# Patient Record
Sex: FEMALE | Race: WHITE | NOT HISPANIC OR LATINO
[De-identification: names, ages, dates, MRNs, and addresses within clinical notes are randomized per-mention and may not be internally consistent; named-entity substitution may affect disease eponyms.]

---

## 2021-07-14 ENCOUNTER — TRANSCRIPTION ENCOUNTER (OUTPATIENT)
Age: 66
End: 2021-07-14

## 2021-07-27 ENCOUNTER — TRANSCRIPTION ENCOUNTER (OUTPATIENT)
Age: 66
End: 2021-07-27

## 2021-12-02 ENCOUNTER — OUTPATIENT (OUTPATIENT)
Dept: OUTPATIENT SERVICES | Facility: HOSPITAL | Age: 66
LOS: 1 days | Discharge: HOME | End: 2021-12-02

## 2021-12-06 ENCOUNTER — OUTPATIENT (OUTPATIENT)
Dept: OUTPATIENT SERVICES | Facility: HOSPITAL | Age: 66
LOS: 1 days | Discharge: HOME | End: 2021-12-06
Payer: MEDICARE

## 2021-12-06 DIAGNOSIS — Z87.310 PERSONAL HISTORY OF (HEALED) OSTEOPOROSIS FRACTURE: ICD-10-CM

## 2021-12-06 DIAGNOSIS — M89.9 DISORDER OF BONE, UNSPECIFIED: ICD-10-CM

## 2021-12-06 DIAGNOSIS — Z78.0 ASYMPTOMATIC MENOPAUSAL STATE: ICD-10-CM

## 2021-12-06 DIAGNOSIS — Z13.820 ENCOUNTER FOR SCREENING FOR OSTEOPOROSIS: ICD-10-CM

## 2021-12-06 DIAGNOSIS — Z12.31 ENCOUNTER FOR SCREENING MAMMOGRAM FOR MALIGNANT NEOPLASM OF BREAST: ICD-10-CM

## 2021-12-06 PROCEDURE — 77067 SCR MAMMO BI INCL CAD: CPT | Mod: 26

## 2021-12-06 PROCEDURE — 77063 BREAST TOMOSYNTHESIS BI: CPT | Mod: 26

## 2021-12-16 ENCOUNTER — TRANSCRIPTION ENCOUNTER (OUTPATIENT)
Age: 66
End: 2021-12-16

## 2022-03-16 ENCOUNTER — TRANSCRIPTION ENCOUNTER (OUTPATIENT)
Age: 67
End: 2022-03-16

## 2022-03-16 ENCOUNTER — EMERGENCY (EMERGENCY)
Facility: HOSPITAL | Age: 67
LOS: 0 days | Discharge: HOME | End: 2022-03-16
Attending: STUDENT IN AN ORGANIZED HEALTH CARE EDUCATION/TRAINING PROGRAM | Admitting: STUDENT IN AN ORGANIZED HEALTH CARE EDUCATION/TRAINING PROGRAM
Payer: MEDICARE

## 2022-03-16 VITALS
RESPIRATION RATE: 20 BRPM | SYSTOLIC BLOOD PRESSURE: 135 MMHG | OXYGEN SATURATION: 97 % | TEMPERATURE: 99 F | DIASTOLIC BLOOD PRESSURE: 70 MMHG | HEART RATE: 89 BPM

## 2022-03-16 VITALS
HEART RATE: 95 BPM | SYSTOLIC BLOOD PRESSURE: 146 MMHG | TEMPERATURE: 100 F | OXYGEN SATURATION: 96 % | WEIGHT: 293 LBS | DIASTOLIC BLOOD PRESSURE: 76 MMHG | RESPIRATION RATE: 19 BRPM

## 2022-03-16 DIAGNOSIS — U07.1 COVID-19: ICD-10-CM

## 2022-03-16 DIAGNOSIS — Z98.890 OTHER SPECIFIED POSTPROCEDURAL STATES: Chronic | ICD-10-CM

## 2022-03-16 DIAGNOSIS — D86.9 SARCOIDOSIS, UNSPECIFIED: ICD-10-CM

## 2022-03-16 DIAGNOSIS — E78.5 HYPERLIPIDEMIA, UNSPECIFIED: ICD-10-CM

## 2022-03-16 DIAGNOSIS — R50.9 FEVER, UNSPECIFIED: ICD-10-CM

## 2022-03-16 DIAGNOSIS — I10 ESSENTIAL (PRIMARY) HYPERTENSION: ICD-10-CM

## 2022-03-16 DIAGNOSIS — R05.9 COUGH, UNSPECIFIED: ICD-10-CM

## 2022-03-16 DIAGNOSIS — R06.02 SHORTNESS OF BREATH: ICD-10-CM

## 2022-03-16 LAB — SARS-COV-2 RNA SPEC QL NAA+PROBE: DETECTED

## 2022-03-16 PROCEDURE — 99284 EMERGENCY DEPT VISIT MOD MDM: CPT | Mod: FS,CS

## 2022-03-16 PROCEDURE — 71045 X-RAY EXAM CHEST 1 VIEW: CPT | Mod: 26

## 2022-03-16 RX ORDER — IPRATROPIUM/ALBUTEROL SULFATE 18-103MCG
3 AEROSOL WITH ADAPTER (GRAM) INHALATION ONCE
Refills: 0 | Status: COMPLETED | OUTPATIENT
Start: 2022-03-16 | End: 2022-03-16

## 2022-03-16 RX ORDER — DEXAMETHASONE 0.5 MG/5ML
1 ELIXIR ORAL
Qty: 5 | Refills: 0
Start: 2022-03-16 | End: 2022-03-20

## 2022-03-16 RX ORDER — DEXAMETHASONE 0.5 MG/5ML
6 ELIXIR ORAL ONCE
Refills: 0 | Status: COMPLETED | OUTPATIENT
Start: 2022-03-16 | End: 2022-03-16

## 2022-03-16 RX ADMIN — Medication 6 MILLIGRAM(S): at 14:05

## 2022-03-16 RX ADMIN — Medication 3 MILLILITER(S): at 14:06

## 2022-03-16 NOTE — ED PROVIDER NOTE - NS ED ATTENDING STATEMENT MOD
This was a shared visit with the PRASANNA. I reviewed and verified the documentation and independently performed the documented:

## 2022-03-16 NOTE — ED PROVIDER NOTE - CLINICAL SUMMARY MEDICAL DECISION MAKING FREE TEXT BOX
66-year-old female with a past medical history of hypertension hyperlipidemia and sarcoidosis presents here complaining of shortness of breath.  Patient states she went to  her grand son on Tuesday found out that he was Covid positive went to the urgent care found out she was Covid positive patient states for the last day or so she is complaining of shortness of breath and discomfort when she coughs.  Fever at home T-max of 99 no nausea vomiting abdominal pain.  Triple vaccinated.  Sating well on room air. VS reviewed labs imaging obtained and reviewed nebs and steroids given. Patient enrolled in monoclonal antibody, pulse 0x provided. Patient a spoken to in detail about results  All questions addressed.  Results of ED work up discussed Strict Return precautions given.

## 2022-03-16 NOTE — ED ADULT TRIAGE NOTE - PAIN: PRESENCE, MLM

## 2022-03-16 NOTE — ED ADULT NURSE NOTE - NSIMPLEMENTINTERV_GEN_ALL_ED
Implemented All Universal Safety Interventions:  [place] to call system. Call bell, personal items and telephone within reach. Instruct patient to call for assistance. Room bathroom lighting operational. Non-slip footwear when patient is off stretcher. Physically safe environment: no spills, clutter or unnecessary equipment. Stretcher in lowest position, wheels locked, appropriate side rails in place.

## 2022-03-16 NOTE — ED PROVIDER NOTE - NSFOLLOWUPINSTRUCTIONS_ED_ALL_ED_FT
Take decadron 6 mg 1 time a day, Use your nebulizer 3-4 time a day, Follow up with mono clonal antibody infusion, Your daughter should receive a call today.

## 2022-03-16 NOTE — ED ADULT NURSE NOTE - NS ED PATIENT SAFETY CONCERN
No
Instructed patient/caregiver regarding signs and symptoms of infection./Verbal/written post procedure instructions were given to patient/caregiver.

## 2022-03-16 NOTE — ED PROVIDER NOTE - ATTENDING CONTRIBUTION TO CARE
66-year-old female with a past medical history of hypertension hyperlipidemia and sarcoidosis presents here complaining of shortness of breath.  Patient states she went to  her grand son on Tuesday found out that he was Covid positive went to the urgent care found out she was Covid positive patient states for the last day or so she is complaining of shortness of breath and discomfort when she coughs.  Fever at home T-max of 99 no nausea vomiting abdominal pain.  Triple vaccinated.    CONSTITUTIONAL: WA / WN / NAD  HEAD: NCAT  EYES: PERRL; EOMI;   ENT: Normal pharynx; mucous membranes pink/moist, no erythema.  NECK: Supple; no meningeal signs  CARD: RRR; nl S1/S2; no M/R/G. Pulses equal bilaterally.  RESP: Respiratory rate and effort are normal; mild b/l crackles  ABD: Soft, NT ND  MSK/EXT: No gross deformities; full range of motion.  SKIN: Warm and dry;   NEURO: AAOx3,  PSYCH: Memory Intact, Normal Affect

## 2022-03-16 NOTE — ED PROVIDER NOTE - OBJECTIVE STATEMENT
Patient c/o cough, SOB, for 2 days, Dx with covid 1 day ago, H/o sarcoidosis, + fever, + chill,  no chest pain

## 2022-03-16 NOTE — ED PROVIDER NOTE - PATIENT PORTAL LINK FT
You can access the FollowMyHealth Patient Portal offered by [facility] by registering at the following website: [email or web address]. By joining [facility]’s FollowMyHealth portal, you will also be able to view your health information using other applications (apps) compatible with our system.

## 2022-03-17 PROBLEM — E78.00 PURE HYPERCHOLESTEROLEMIA, UNSPECIFIED: Chronic | Status: ACTIVE | Noted: 2022-03-16

## 2022-03-17 PROBLEM — D86.9 SARCOIDOSIS, UNSPECIFIED: Chronic | Status: ACTIVE | Noted: 2022-03-16

## 2022-03-17 PROBLEM — I10 ESSENTIAL (PRIMARY) HYPERTENSION: Chronic | Status: ACTIVE | Noted: 2022-03-16

## 2022-03-18 ENCOUNTER — APPOINTMENT (OUTPATIENT)
Dept: DISASTER EMERGENCY | Facility: HOSPITAL | Age: 67
End: 2022-03-18

## 2022-03-18 ENCOUNTER — OUTPATIENT (OUTPATIENT)
Dept: OUTPATIENT SERVICES | Facility: HOSPITAL | Age: 67
LOS: 1 days | End: 2022-03-18

## 2022-03-18 VITALS
HEART RATE: 74 BPM | RESPIRATION RATE: 18 BRPM | SYSTOLIC BLOOD PRESSURE: 133 MMHG | TEMPERATURE: 99 F | OXYGEN SATURATION: 94 % | DIASTOLIC BLOOD PRESSURE: 80 MMHG

## 2022-03-18 VITALS
DIASTOLIC BLOOD PRESSURE: 71 MMHG | WEIGHT: 173.94 LBS | RESPIRATION RATE: 18 BRPM | OXYGEN SATURATION: 94 % | TEMPERATURE: 98 F | HEART RATE: 81 BPM | HEIGHT: 63 IN | SYSTOLIC BLOOD PRESSURE: 149 MMHG

## 2022-03-18 DIAGNOSIS — Z98.890 OTHER SPECIFIED POSTPROCEDURAL STATES: Chronic | ICD-10-CM

## 2022-03-18 DIAGNOSIS — U07.1 COVID-19: ICD-10-CM

## 2022-03-18 RX ORDER — SOTROVIMAB 62.5 MG/ML
500 INJECTION, SOLUTION, CONCENTRATE INTRAVENOUS ONCE
Refills: 0 | Status: COMPLETED | OUTPATIENT
Start: 2022-03-18 | End: 2022-03-18

## 2022-03-18 RX ORDER — SODIUM CHLORIDE 9 MG/ML
250 INJECTION INTRAMUSCULAR; INTRAVENOUS; SUBCUTANEOUS
Refills: 0 | Status: COMPLETED | OUTPATIENT
Start: 2022-03-18 | End: 2022-03-18

## 2022-03-18 RX ADMIN — SOTROVIMAB 116 MILLIGRAM(S): 62.5 INJECTION, SOLUTION, CONCENTRATE INTRAVENOUS at 12:18

## 2022-03-18 RX ADMIN — SODIUM CHLORIDE 100 MILLILITER(S): 9 INJECTION INTRAMUSCULAR; INTRAVENOUS; SUBCUTANEOUS at 12:19

## 2022-03-18 NOTE — MONOCLONAL ANTIBODY INFUSION - ASSESSMENT AND PLAN
65 y/o female w/ hx of sarcoidosis on steroids who came in for monoclonal antibody infusion for being tested positive for COVID 19 on 3/8/22 The pt states she has been experiencing HA, sore throat & cough on 3/8/22. She is fully vaxxed & boostered.    PLAN:  - infusion procedure explained to patient   - Consent for monoclonal antibody infusion obtained   - Risk & benefits discussed/all questions answered  - infuse Sotrovimab 500 mg over 30 minutes   -observe patient for one hour post infusion     I have reviewed the Sotrovimab Emergency Use Authorization (EUA) and I have provided the patient or patient's caregiver with the following information:    1. FDA has authorized emergency use Sotrovimab, which is not an FDA-approved biological product.  2. The patient or patient's caregiver has the option to accept or refuse administration of Sotrovimab.  3. The significant known and potential risks and benefits of Sotrovimab and the extent to which such risks and benefits are unknown.  4. Information on available alternative treatments and risks and benefits of those alternatives.     The patient's COVID monoclonal antibody infusion went well without any complications. The patient tolerated the infusion without any reactions. Her/His vitals were stable throughout the infusion. The pt denies any CP, fevers, chills, SOB, numbness/tingling in b/l limbs, loss of sensation or motor function, n/v/d while receiving the transfusion. She denies any symptoms an hour after post infusion. Her vitals were taken post transfusion and were stable. Pt is medically cleared to be discharged home. Discharge instructions were provided to the patient with a fact sheet included. Patient was instructed to self-isolate and use infection control measures (e.g wear mask, isolate, social distance, avoid sharing personal items, clean and disinfect "high touch" surfaces, and frequent handwashing according to the CDC guidelines. The patient was informed o what symptoms to be aware of for the next couple of days, and if there are any issues to call the 24/7 clinical call center. Patient was instructed to follow up with her PCP as needed.

## 2022-03-18 NOTE — MONOCLONAL ANTIBODY INFUSION - EXAM
Physical Exam:    Appearance: NAD	  Skin: warm and dry  Neurologic: Non-focal  Extremities: Normal range of motion

## 2022-07-29 PROBLEM — Z00.00 ENCOUNTER FOR PREVENTIVE HEALTH EXAMINATION: Status: ACTIVE | Noted: 2022-07-29

## 2022-08-02 ENCOUNTER — APPOINTMENT (OUTPATIENT)
Dept: NEUROSURGERY | Facility: CLINIC | Age: 67
End: 2022-08-02

## 2022-08-02 DIAGNOSIS — G56.03 CARPAL TUNNEL SYNDROM,BILATERAL UPPER LIMBS: ICD-10-CM

## 2022-08-02 PROCEDURE — 99204 OFFICE O/P NEW MOD 45 MIN: CPT

## 2022-08-02 NOTE — PHYSICAL EXAM
[de-identified] :   She is a pleasant 66-year-old female.  She is alert and oriented x4.  She is moving all her extremities well with 5/5 strength.  There is some pain inhibited weakness to the left external rotator.  She has positive bilateral Tinel sign at the wrist.  Negative Dalton.  She has is some loss of range of motion of the cervical spine secondary to pain.  She has tenderness to the midline of the cervical spine from the C4-5 region down.\par \par She walks with a steady, independent gait.

## 2022-08-02 NOTE — HISTORY OF PRESENT ILLNESS
[de-identified] : Patient presents today for neurosurgical consultation.  She presents with ongoing neck pain as well as numbness and tingling in the upper extremities, right worse than left.  The numbness and tingling is in a C6 distribution.  She has pain with range of motion of her neck.  She has not undergone any conservative treatment such as injection treatments pain management.  She did try some bracing to her bilateral wrists for treatment of suspected carpal tunnel syndrome however without improvement.\par \par MRI c-spine was completed at [facility] June 2022 and reveals C5-6-7 disc herniation with mild to moderate central canal stenosis and bilateral moderate foraminal narrowing. There is a slight anterolisthesis also appreciated at C4-5.

## 2022-08-02 NOTE — DISCUSSION/SUMMARY
[de-identified] : PLAN:   I had a long and thorough discussion with the patient regarding her findings, symptoms, treatment options.  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  She will trial a course of vitamin B6 in effort to alleviate the paresthesias.  She  is interested in conservative treatment measures such as injection treatments.  I have referred her to a pain management specialist for consideration.  In the future EMG testing would likely be of benefit.  She will follow-up with me in the future after undergoing some injection treatments.  She will also complete flexion and extension x-rays to evaluate for instability given the anterolisthesis appreciated on MRI imaging.  She understood our plan of care well.  She will call barring any issues. \par \par Refugio Anna MD\par \par I, Emilia Joaquin, attest that this documentation has been prepared under the direction and in the presence of Provider Refugio Anna MD. \par \par Thank you for allowing me to assist in the care of this patient.\par

## 2022-08-12 ENCOUNTER — EMERGENCY (EMERGENCY)
Facility: HOSPITAL | Age: 67
LOS: 0 days | Discharge: HOME | End: 2022-08-12
Attending: EMERGENCY MEDICINE | Admitting: EMERGENCY MEDICINE

## 2022-08-12 VITALS
HEART RATE: 69 BPM | DIASTOLIC BLOOD PRESSURE: 69 MMHG | OXYGEN SATURATION: 97 % | SYSTOLIC BLOOD PRESSURE: 125 MMHG | RESPIRATION RATE: 18 BRPM

## 2022-08-12 VITALS
WEIGHT: 173.94 LBS | OXYGEN SATURATION: 97 % | HEART RATE: 89 BPM | TEMPERATURE: 99 F | SYSTOLIC BLOOD PRESSURE: 157 MMHG | RESPIRATION RATE: 18 BRPM | HEIGHT: 63 IN | DIASTOLIC BLOOD PRESSURE: 93 MMHG

## 2022-08-12 DIAGNOSIS — Z98.890 OTHER SPECIFIED POSTPROCEDURAL STATES: Chronic | ICD-10-CM

## 2022-08-12 DIAGNOSIS — R06.02 SHORTNESS OF BREATH: ICD-10-CM

## 2022-08-12 DIAGNOSIS — E78.00 PURE HYPERCHOLESTEROLEMIA, UNSPECIFIED: ICD-10-CM

## 2022-08-12 DIAGNOSIS — R20.2 PARESTHESIA OF SKIN: ICD-10-CM

## 2022-08-12 DIAGNOSIS — R00.2 PALPITATIONS: ICD-10-CM

## 2022-08-12 DIAGNOSIS — R07.89 OTHER CHEST PAIN: ICD-10-CM

## 2022-08-12 DIAGNOSIS — I10 ESSENTIAL (PRIMARY) HYPERTENSION: ICD-10-CM

## 2022-08-12 DIAGNOSIS — D86.9 SARCOIDOSIS, UNSPECIFIED: ICD-10-CM

## 2022-08-12 LAB
ALBUMIN SERPL ELPH-MCNC: 4.6 G/DL — SIGNIFICANT CHANGE UP (ref 3.5–5.2)
ALP SERPL-CCNC: 72 U/L — SIGNIFICANT CHANGE UP (ref 30–115)
ALT FLD-CCNC: 16 U/L — SIGNIFICANT CHANGE UP (ref 0–41)
ANION GAP SERPL CALC-SCNC: 10 MMOL/L — SIGNIFICANT CHANGE UP (ref 7–14)
AST SERPL-CCNC: 17 U/L — SIGNIFICANT CHANGE UP (ref 0–41)
BASOPHILS # BLD AUTO: 0.03 K/UL — SIGNIFICANT CHANGE UP (ref 0–0.2)
BASOPHILS NFR BLD AUTO: 0.5 % — SIGNIFICANT CHANGE UP (ref 0–1)
BILIRUB SERPL-MCNC: 0.2 MG/DL — SIGNIFICANT CHANGE UP (ref 0.2–1.2)
BUN SERPL-MCNC: 16 MG/DL — SIGNIFICANT CHANGE UP (ref 10–20)
CALCIUM SERPL-MCNC: 9.4 MG/DL — SIGNIFICANT CHANGE UP (ref 8.5–10.1)
CHLORIDE SERPL-SCNC: 100 MMOL/L — SIGNIFICANT CHANGE UP (ref 98–110)
CO2 SERPL-SCNC: 27 MMOL/L — SIGNIFICANT CHANGE UP (ref 17–32)
CREAT SERPL-MCNC: 0.7 MG/DL — SIGNIFICANT CHANGE UP (ref 0.7–1.5)
EGFR: 94 ML/MIN/1.73M2 — SIGNIFICANT CHANGE UP
EOSINOPHIL # BLD AUTO: 0.09 K/UL — SIGNIFICANT CHANGE UP (ref 0–0.7)
EOSINOPHIL NFR BLD AUTO: 1.4 % — SIGNIFICANT CHANGE UP (ref 0–8)
GLUCOSE SERPL-MCNC: 122 MG/DL — HIGH (ref 70–99)
HCT VFR BLD CALC: 36.6 % — LOW (ref 37–47)
HGB BLD-MCNC: 12.2 G/DL — SIGNIFICANT CHANGE UP (ref 12–16)
IMM GRANULOCYTES NFR BLD AUTO: 0.3 % — SIGNIFICANT CHANGE UP (ref 0.1–0.3)
LYMPHOCYTES # BLD AUTO: 1.43 K/UL — SIGNIFICANT CHANGE UP (ref 1.2–3.4)
LYMPHOCYTES # BLD AUTO: 22.3 % — SIGNIFICANT CHANGE UP (ref 20.5–51.1)
MAGNESIUM SERPL-MCNC: 2 MG/DL — SIGNIFICANT CHANGE UP (ref 1.8–2.4)
MCHC RBC-ENTMCNC: 27.1 PG — SIGNIFICANT CHANGE UP (ref 27–31)
MCHC RBC-ENTMCNC: 33.3 G/DL — SIGNIFICANT CHANGE UP (ref 32–37)
MCV RBC AUTO: 81.3 FL — SIGNIFICANT CHANGE UP (ref 81–99)
MONOCYTES # BLD AUTO: 0.46 K/UL — SIGNIFICANT CHANGE UP (ref 0.1–0.6)
MONOCYTES NFR BLD AUTO: 7.2 % — SIGNIFICANT CHANGE UP (ref 1.7–9.3)
NEUTROPHILS # BLD AUTO: 4.38 K/UL — SIGNIFICANT CHANGE UP (ref 1.4–6.5)
NEUTROPHILS NFR BLD AUTO: 68.3 % — SIGNIFICANT CHANGE UP (ref 42.2–75.2)
NRBC # BLD: 0 /100 WBCS — SIGNIFICANT CHANGE UP (ref 0–0)
PLATELET # BLD AUTO: 227 K/UL — SIGNIFICANT CHANGE UP (ref 130–400)
POTASSIUM SERPL-MCNC: 3.6 MMOL/L — SIGNIFICANT CHANGE UP (ref 3.5–5)
POTASSIUM SERPL-SCNC: 3.6 MMOL/L — SIGNIFICANT CHANGE UP (ref 3.5–5)
PROT SERPL-MCNC: 6.6 G/DL — SIGNIFICANT CHANGE UP (ref 6–8)
RBC # BLD: 4.5 M/UL — SIGNIFICANT CHANGE UP (ref 4.2–5.4)
RBC # FLD: 12.8 % — SIGNIFICANT CHANGE UP (ref 11.5–14.5)
SARS-COV-2 RNA SPEC QL NAA+PROBE: SIGNIFICANT CHANGE UP
SODIUM SERPL-SCNC: 137 MMOL/L — SIGNIFICANT CHANGE UP (ref 135–146)
TROPONIN T SERPL-MCNC: <0.01 NG/ML — SIGNIFICANT CHANGE UP
WBC # BLD: 6.41 K/UL — SIGNIFICANT CHANGE UP (ref 4.8–10.8)
WBC # FLD AUTO: 6.41 K/UL — SIGNIFICANT CHANGE UP (ref 4.8–10.8)

## 2022-08-12 PROCEDURE — 99285 EMERGENCY DEPT VISIT HI MDM: CPT | Mod: CS

## 2022-08-12 PROCEDURE — 71045 X-RAY EXAM CHEST 1 VIEW: CPT | Mod: 26

## 2022-08-12 PROCEDURE — 93010 ELECTROCARDIOGRAM REPORT: CPT

## 2022-08-12 RX ORDER — SODIUM CHLORIDE 9 MG/ML
500 INJECTION, SOLUTION INTRAVENOUS ONCE
Refills: 0 | Status: COMPLETED | OUTPATIENT
Start: 2022-08-12 | End: 2022-08-12

## 2022-08-12 RX ADMIN — SODIUM CHLORIDE 500 MILLILITER(S): 9 INJECTION, SOLUTION INTRAVENOUS at 19:17

## 2022-08-12 NOTE — ED PROVIDER NOTE - ATTENDING CONTRIBUTION TO CARE
Patient is 58-year-old female who was up at night after drinking tea having palpitations.  She woke up again from sleep around 4 AM with chest tightness over the left chest nonradiating.  Mild shortness of breath.  Intermittent unrelated to exertion.    Exam: Regular rate and rhythm, lungs clear to auscultation, no JVD, no lower extremity edema, no calf tenderness, soft nontender abdomen, no acute distress  Plan: Labs, chest x-ray, EKG

## 2022-08-12 NOTE — ED PROVIDER NOTE - CARE PROVIDER_API CALL
Saúl Hall)  Cardiovascular Disease; Internal Medicine  [address] [place] [address]  Phone: [phone]  Fax: [phone]  Follow Up Time: Routine

## 2022-08-12 NOTE — ED PROVIDER NOTE - CLINICAL SUMMARY MEDICAL DECISION MAKING FREE TEXT BOX
palpitations and chest pain - neg ekg/trop/xr - possibly related to caffeine use - dc home with cardio f/u

## 2022-08-12 NOTE — ED PROVIDER NOTE - PROGRESS NOTE DETAILS
Yanira: Discussed results and risk for ACS with patient. Reassured. Pt feels well and ready to go home.

## 2022-08-12 NOTE — ED PROVIDER NOTE - NS ED ROS FT
Constitutional: No fever  Eyes:  No visual changes, eye pain, or discharge.  ENMT:  No hearing changes, ear pain, sore throat, runny nose, or difficulty swallowing  Cardiac: +chest tightness. No chest pain, SOB or edema. No chest pain with exertion.  Respiratory:  No cough or respiratory distress.   GI:  No nausea, vomiting, diarrhea or abdominal pain.  :  No dysuria, frequency or burning.  MS:  No myalgia, muscle weakness, joint pain or back pain.  Neuro:  No headache or weakness.  No LOC.  Skin:  No skin rash.

## 2022-08-12 NOTE — ED ADULT NURSE NOTE - NSFALLRSKASSESSDT_ED_ALL_ED
12-Aug-2022 20:44 Cibinqo Counseling: I discussed with the patient the risks of Cibinqo therapy including but not limited to common cold, nausea, headache, cold sores, increased blood CPK levels, dizziness, UTIs, fatigue, acne, and vomitting. Live vaccines should be avoided.  This medication has been linked to serious infections; higher rate of mortality; malignancy and lymphoproliferative disorders; major adverse cardiovascular events; thrombosis; thrombocytopenia and lymphopenia; lipid elevations; and retinal detachment.

## 2022-08-12 NOTE — ED PROVIDER NOTE - OBJECTIVE STATEMENT
65 y/o F with PMH HTN, HLD, sarcoidosis presenting with chest tightness that started last night at 1am. A/w tingling in BUE and BLE. Symptoms went away and returned at 4am, waking her up out of her sleep. Has been intermittent today. Not associated with exertion/activity. Denies SOB, fever, chills, cough, abd pain, N/V, diaphoresis, back pain, weakness, difficulty walking. Nonsmoker.

## 2022-08-12 NOTE — ED PROVIDER NOTE - PHYSICAL EXAMINATION
CONSTITUTIONAL: Well-developed; well-nourished; in no acute distress.   SKIN: Warm, dry  HEAD: Normocephalic; atraumatic  EYES: PERRL, EOMI, normal sclera and conjunctiva   ENT: No nasal discharge; airway clear. MMM  NECK: Supple; non tender.  CARD:  Regular rate and rhythm. Normal S1, S2. 2+ distal pulses. No LE edema.  RESP: No increased WOB. CTA b/l without wheezes, crackles, rhonchi  ABD: Normoactive BS. Soft, nontender, nondistended.  EXT: Normal ROM.   LYMPH: No acute cervical adenopathy.  NEURO: Alert, oriented, grossly unremarkable  PSYCH: Cooperative, appropriate.

## 2022-08-24 ENCOUNTER — OUTPATIENT (OUTPATIENT)
Dept: OUTPATIENT SERVICES | Facility: HOSPITAL | Age: 67
LOS: 1 days | Discharge: HOME | End: 2022-08-24

## 2022-08-24 ENCOUNTER — NON-APPOINTMENT (OUTPATIENT)
Age: 67
End: 2022-08-24

## 2022-08-24 DIAGNOSIS — R94.39 ABNORMAL RESULT OF OTHER CARDIOVASCULAR FUNCTION STUDY: ICD-10-CM

## 2022-08-24 DIAGNOSIS — Z98.890 OTHER SPECIFIED POSTPROCEDURAL STATES: Chronic | ICD-10-CM

## 2022-08-24 PROCEDURE — 75574 CT ANGIO HRT W/3D IMAGE: CPT | Mod: 26,MH

## 2022-08-25 ENCOUNTER — INPATIENT (INPATIENT)
Facility: HOSPITAL | Age: 67
LOS: 5 days | Discharge: HOME | End: 2022-08-31
Attending: STUDENT IN AN ORGANIZED HEALTH CARE EDUCATION/TRAINING PROGRAM | Admitting: STUDENT IN AN ORGANIZED HEALTH CARE EDUCATION/TRAINING PROGRAM

## 2022-08-25 ENCOUNTER — APPOINTMENT (OUTPATIENT)
Dept: PAIN MANAGEMENT | Facility: CLINIC | Age: 67
End: 2022-08-25

## 2022-08-25 VITALS
HEART RATE: 111 BPM | TEMPERATURE: 98 F | OXYGEN SATURATION: 95 % | DIASTOLIC BLOOD PRESSURE: 98 MMHG | SYSTOLIC BLOOD PRESSURE: 160 MMHG | RESPIRATION RATE: 18 BRPM | HEIGHT: 63 IN

## 2022-08-25 DIAGNOSIS — Z98.890 OTHER SPECIFIED POSTPROCEDURAL STATES: Chronic | ICD-10-CM

## 2022-08-25 LAB
ALBUMIN SERPL ELPH-MCNC: 4.8 G/DL — SIGNIFICANT CHANGE UP (ref 3.5–5.2)
ALP SERPL-CCNC: 94 U/L — SIGNIFICANT CHANGE UP (ref 30–115)
ALT FLD-CCNC: 22 U/L — SIGNIFICANT CHANGE UP (ref 0–41)
ANION GAP SERPL CALC-SCNC: 14 MMOL/L — SIGNIFICANT CHANGE UP (ref 7–14)
AST SERPL-CCNC: 24 U/L — SIGNIFICANT CHANGE UP (ref 0–41)
BASOPHILS # BLD AUTO: 0.03 K/UL — SIGNIFICANT CHANGE UP (ref 0–0.2)
BASOPHILS NFR BLD AUTO: 0.5 % — SIGNIFICANT CHANGE UP (ref 0–1)
BILIRUB SERPL-MCNC: <0.2 MG/DL — SIGNIFICANT CHANGE UP (ref 0.2–1.2)
BUN SERPL-MCNC: 17 MG/DL — SIGNIFICANT CHANGE UP (ref 10–20)
CALCIUM SERPL-MCNC: 9.5 MG/DL — SIGNIFICANT CHANGE UP (ref 8.5–10.1)
CHLORIDE SERPL-SCNC: 105 MMOL/L — SIGNIFICANT CHANGE UP (ref 98–110)
CO2 SERPL-SCNC: 24 MMOL/L — SIGNIFICANT CHANGE UP (ref 17–32)
CREAT SERPL-MCNC: 0.7 MG/DL — SIGNIFICANT CHANGE UP (ref 0.7–1.5)
EGFR: 94 ML/MIN/1.73M2 — SIGNIFICANT CHANGE UP
EOSINOPHIL # BLD AUTO: 0.13 K/UL — SIGNIFICANT CHANGE UP (ref 0–0.7)
EOSINOPHIL NFR BLD AUTO: 2.2 % — SIGNIFICANT CHANGE UP (ref 0–8)
GLUCOSE SERPL-MCNC: 188 MG/DL — HIGH (ref 70–99)
HCT VFR BLD CALC: 39.4 % — SIGNIFICANT CHANGE UP (ref 37–47)
HGB BLD-MCNC: 13.2 G/DL — SIGNIFICANT CHANGE UP (ref 12–16)
IMM GRANULOCYTES NFR BLD AUTO: 0.2 % — SIGNIFICANT CHANGE UP (ref 0.1–0.3)
LYMPHOCYTES # BLD AUTO: 1.82 K/UL — SIGNIFICANT CHANGE UP (ref 1.2–3.4)
LYMPHOCYTES # BLD AUTO: 30.3 % — SIGNIFICANT CHANGE UP (ref 20.5–51.1)
MCHC RBC-ENTMCNC: 27.3 PG — SIGNIFICANT CHANGE UP (ref 27–31)
MCHC RBC-ENTMCNC: 33.5 G/DL — SIGNIFICANT CHANGE UP (ref 32–37)
MCV RBC AUTO: 81.4 FL — SIGNIFICANT CHANGE UP (ref 81–99)
MONOCYTES # BLD AUTO: 0.42 K/UL — SIGNIFICANT CHANGE UP (ref 0.1–0.6)
MONOCYTES NFR BLD AUTO: 7 % — SIGNIFICANT CHANGE UP (ref 1.7–9.3)
NEUTROPHILS # BLD AUTO: 3.6 K/UL — SIGNIFICANT CHANGE UP (ref 1.4–6.5)
NEUTROPHILS NFR BLD AUTO: 59.8 % — SIGNIFICANT CHANGE UP (ref 42.2–75.2)
NRBC # BLD: 0 /100 WBCS — SIGNIFICANT CHANGE UP (ref 0–0)
PLATELET # BLD AUTO: 251 K/UL — SIGNIFICANT CHANGE UP (ref 130–400)
POTASSIUM SERPL-MCNC: 4.2 MMOL/L — SIGNIFICANT CHANGE UP (ref 3.5–5)
POTASSIUM SERPL-SCNC: 4.2 MMOL/L — SIGNIFICANT CHANGE UP (ref 3.5–5)
PROT SERPL-MCNC: 7.3 G/DL — SIGNIFICANT CHANGE UP (ref 6–8)
RBC # BLD: 4.84 M/UL — SIGNIFICANT CHANGE UP (ref 4.2–5.4)
RBC # FLD: 13.2 % — SIGNIFICANT CHANGE UP (ref 11.5–14.5)
SODIUM SERPL-SCNC: 143 MMOL/L — SIGNIFICANT CHANGE UP (ref 135–146)
TROPONIN T SERPL-MCNC: <0.01 NG/ML — SIGNIFICANT CHANGE UP
WBC # BLD: 6.01 K/UL — SIGNIFICANT CHANGE UP (ref 4.8–10.8)
WBC # FLD AUTO: 6.01 K/UL — SIGNIFICANT CHANGE UP (ref 4.8–10.8)

## 2022-08-25 PROCEDURE — 99285 EMERGENCY DEPT VISIT HI MDM: CPT | Mod: GC

## 2022-08-25 PROCEDURE — 93010 ELECTROCARDIOGRAM REPORT: CPT

## 2022-08-25 PROCEDURE — 71045 X-RAY EXAM CHEST 1 VIEW: CPT | Mod: 26

## 2022-08-25 RX ORDER — MAGNESIUM SULFATE 500 MG/ML
2 VIAL (ML) INJECTION ONCE
Refills: 0 | Status: COMPLETED | OUTPATIENT
Start: 2022-08-25 | End: 2022-08-25

## 2022-08-25 RX ORDER — SODIUM CHLORIDE 9 MG/ML
1000 INJECTION, SOLUTION INTRAVENOUS ONCE
Refills: 0 | Status: COMPLETED | OUTPATIENT
Start: 2022-08-25 | End: 2022-08-25

## 2022-08-25 RX ADMIN — SODIUM CHLORIDE 1000 MILLILITER(S): 9 INJECTION, SOLUTION INTRAVENOUS at 21:17

## 2022-08-25 RX ADMIN — Medication 25 GRAM(S): at 21:13

## 2022-08-25 NOTE — ED PROVIDER NOTE - PHYSICAL EXAMINATION
CONSTITUTIONAL:  in mild acute distress.   SKIN: warm, dry  HEAD: Normocephalic; atraumatic.  EYES: PERRL, EOMI, normal sclera and conjunctiva   ENT: No nasal discharge; airway clear.  NECK: Supple; non tender.  CARD:  Regular rate and rhythm.   RESP: NO inc WOB   ABD: soft ntnd  EXT: Normal ROM.    LYMPH: No acute cervical adenopathy.  NEURO: Alert, oriented, grossly unremarkable  PSYCH: Cooperative, appropriate.

## 2022-08-25 NOTE — ED ADULT NURSE NOTE - OBJECTIVE STATEMENT
Pt came from home complaining of palpitations, pt also reported headache, shortness of breath. Daughter at bedside.

## 2022-08-25 NOTE — ED ADULT TRIAGE NOTE - CHIEF COMPLAINT QUOTE
pt biba for svt. pt sts felt palpitations and a ha, was given adenosine 6mg enroute, heart rate 110-120 now.

## 2022-08-25 NOTE — ED PROVIDER NOTE - OBJECTIVE STATEMENT
65 y/o F with PMH HTN, HLD, sarcoidosis presenting with chest tightness that started 66 yo F patient with a PMH of HTN and pulmonary sarcoidosis presents to the ED for palpitations. Patient states that she started having these palpitations earlier today, appeared suddenly, without any precipitating/ relieving factors, with no chest pain, shortness of breath, lightheadedness, or syncope. Patient states also that she felt tingling sensation in her fingers, as well as a globus sensation; questionable depersonalization. Patient says that she gets sometimes heat intolerance, without any other hyperthyroid symptoms.

## 2022-08-25 NOTE — ED PROVIDER NOTE - CLINICAL SUMMARY MEDICAL DECISION MAKING FREE TEXT BOX
67-year-old male presented today with recurrent SVT. Patient was given adenosine on route and had decrease in her heart rate however still in sinus tachycardia in the 120s. Patient will be admitted to telemetry for further evaluation and management given recurrent episodes of this presentation. Labs are unremarkable.

## 2022-08-26 DIAGNOSIS — Z02.9 ENCOUNTER FOR ADMINISTRATIVE EXAMINATIONS, UNSPECIFIED: ICD-10-CM

## 2022-08-26 LAB
ALBUMIN SERPL ELPH-MCNC: 3.9 G/DL — SIGNIFICANT CHANGE UP (ref 3.5–5.2)
ALP SERPL-CCNC: 68 U/L — SIGNIFICANT CHANGE UP (ref 30–115)
ALT FLD-CCNC: 20 U/L — SIGNIFICANT CHANGE UP (ref 0–41)
ANION GAP SERPL CALC-SCNC: 7 MMOL/L — SIGNIFICANT CHANGE UP (ref 7–14)
AST SERPL-CCNC: 18 U/L — SIGNIFICANT CHANGE UP (ref 0–41)
BASOPHILS # BLD AUTO: 0.02 K/UL — SIGNIFICANT CHANGE UP (ref 0–0.2)
BASOPHILS NFR BLD AUTO: 0.4 % — SIGNIFICANT CHANGE UP (ref 0–1)
BILIRUB SERPL-MCNC: 0.3 MG/DL — SIGNIFICANT CHANGE UP (ref 0.2–1.2)
BUN SERPL-MCNC: 11 MG/DL — SIGNIFICANT CHANGE UP (ref 10–20)
CALCIUM SERPL-MCNC: 8.7 MG/DL — SIGNIFICANT CHANGE UP (ref 8.5–10.1)
CHLORIDE SERPL-SCNC: 107 MMOL/L — SIGNIFICANT CHANGE UP (ref 98–110)
CO2 SERPL-SCNC: 27 MMOL/L — SIGNIFICANT CHANGE UP (ref 17–32)
CREAT SERPL-MCNC: 0.6 MG/DL — LOW (ref 0.7–1.5)
EGFR: 97 ML/MIN/1.73M2 — SIGNIFICANT CHANGE UP
EOSINOPHIL # BLD AUTO: 0.16 K/UL — SIGNIFICANT CHANGE UP (ref 0–0.7)
EOSINOPHIL NFR BLD AUTO: 3.4 % — SIGNIFICANT CHANGE UP (ref 0–8)
GLUCOSE SERPL-MCNC: 109 MG/DL — HIGH (ref 70–99)
HCT VFR BLD CALC: 33 % — LOW (ref 37–47)
HCV AB S/CO SERPL IA: 0.04 COI — SIGNIFICANT CHANGE UP
HCV AB SERPL-IMP: SIGNIFICANT CHANGE UP
HGB BLD-MCNC: 11.3 G/DL — LOW (ref 12–16)
IMM GRANULOCYTES NFR BLD AUTO: 0.2 % — SIGNIFICANT CHANGE UP (ref 0.1–0.3)
LYMPHOCYTES # BLD AUTO: 1.58 K/UL — SIGNIFICANT CHANGE UP (ref 1.2–3.4)
LYMPHOCYTES # BLD AUTO: 33.7 % — SIGNIFICANT CHANGE UP (ref 20.5–51.1)
MAGNESIUM SERPL-MCNC: 2.3 MG/DL — SIGNIFICANT CHANGE UP (ref 1.8–2.4)
MCHC RBC-ENTMCNC: 27.9 PG — SIGNIFICANT CHANGE UP (ref 27–31)
MCHC RBC-ENTMCNC: 34.2 G/DL — SIGNIFICANT CHANGE UP (ref 32–37)
MCV RBC AUTO: 81.5 FL — SIGNIFICANT CHANGE UP (ref 81–99)
MONOCYTES # BLD AUTO: 0.44 K/UL — SIGNIFICANT CHANGE UP (ref 0.1–0.6)
MONOCYTES NFR BLD AUTO: 9.4 % — HIGH (ref 1.7–9.3)
NEUTROPHILS # BLD AUTO: 2.48 K/UL — SIGNIFICANT CHANGE UP (ref 1.4–6.5)
NEUTROPHILS NFR BLD AUTO: 52.9 % — SIGNIFICANT CHANGE UP (ref 42.2–75.2)
NRBC # BLD: 0 /100 WBCS — SIGNIFICANT CHANGE UP (ref 0–0)
PLATELET # BLD AUTO: 207 K/UL — SIGNIFICANT CHANGE UP (ref 130–400)
POTASSIUM SERPL-MCNC: 4.4 MMOL/L — SIGNIFICANT CHANGE UP (ref 3.5–5)
POTASSIUM SERPL-SCNC: 4.4 MMOL/L — SIGNIFICANT CHANGE UP (ref 3.5–5)
PROT SERPL-MCNC: 6.1 G/DL — SIGNIFICANT CHANGE UP (ref 6–8)
RBC # BLD: 4.05 M/UL — LOW (ref 4.2–5.4)
RBC # FLD: 13.3 % — SIGNIFICANT CHANGE UP (ref 11.5–14.5)
SARS-COV-2 RNA SPEC QL NAA+PROBE: SIGNIFICANT CHANGE UP
SODIUM SERPL-SCNC: 141 MMOL/L — SIGNIFICANT CHANGE UP (ref 135–146)
TROPONIN T SERPL-MCNC: <0.01 NG/ML — SIGNIFICANT CHANGE UP
TROPONIN T SERPL-MCNC: <0.01 NG/ML — SIGNIFICANT CHANGE UP
TSH SERPL-MCNC: 4.23 UIU/ML — HIGH (ref 0.27–4.2)
WBC # BLD: 4.69 K/UL — LOW (ref 4.8–10.8)
WBC # FLD AUTO: 4.69 K/UL — LOW (ref 4.8–10.8)

## 2022-08-26 PROCEDURE — 93306 TTE W/DOPPLER COMPLETE: CPT | Mod: 26

## 2022-08-26 PROCEDURE — 93010 ELECTROCARDIOGRAM REPORT: CPT

## 2022-08-26 PROCEDURE — 99223 1ST HOSP IP/OBS HIGH 75: CPT | Mod: AI

## 2022-08-26 RX ORDER — ASPIRIN/CALCIUM CARB/MAGNESIUM 324 MG
81 TABLET ORAL DAILY
Refills: 0 | Status: DISCONTINUED | OUTPATIENT
Start: 2022-08-26 | End: 2022-08-31

## 2022-08-26 RX ORDER — GABAPENTIN 400 MG/1
100 CAPSULE ORAL AT BEDTIME
Refills: 0 | Status: DISCONTINUED | OUTPATIENT
Start: 2022-08-26 | End: 2022-08-31

## 2022-08-26 RX ORDER — ENOXAPARIN SODIUM 100 MG/ML
40 INJECTION SUBCUTANEOUS EVERY 24 HOURS
Refills: 0 | Status: DISCONTINUED | OUTPATIENT
Start: 2022-08-26 | End: 2022-08-31

## 2022-08-26 RX ORDER — LISINOPRIL 2.5 MG/1
0 TABLET ORAL
Qty: 0 | Refills: 0 | DISCHARGE

## 2022-08-26 RX ORDER — OMEGA-3 ACID ETHYL ESTERS 1 G
0 CAPSULE ORAL
Qty: 0 | Refills: 0 | DISCHARGE

## 2022-08-26 RX ORDER — AMLODIPINE BESYLATE 2.5 MG/1
5 TABLET ORAL ONCE
Refills: 0 | Status: DISCONTINUED | OUTPATIENT
Start: 2022-08-26 | End: 2022-08-28

## 2022-08-26 RX ORDER — IPRATROPIUM/ALBUTEROL SULFATE 18-103MCG
3 AEROSOL WITH ADAPTER (GRAM) INHALATION EVERY 12 HOURS
Refills: 0 | Status: DISCONTINUED | OUTPATIENT
Start: 2022-08-26 | End: 2022-08-31

## 2022-08-26 RX ORDER — MAGNESIUM SULFATE 500 MG/ML
2 VIAL (ML) INJECTION ONCE
Refills: 0 | Status: COMPLETED | OUTPATIENT
Start: 2022-08-26 | End: 2022-08-26

## 2022-08-26 RX ORDER — ASCORBIC ACID 60 MG
0 TABLET,CHEWABLE ORAL
Qty: 0 | Refills: 0 | DISCHARGE

## 2022-08-26 RX ADMIN — Medication 25 GRAM(S): at 05:15

## 2022-08-26 RX ADMIN — GABAPENTIN 100 MILLIGRAM(S): 400 CAPSULE ORAL at 22:05

## 2022-08-26 RX ADMIN — Medication 81 MILLIGRAM(S): at 12:33

## 2022-08-26 NOTE — H&P ADULT - CONVERSATION DETAILS
`Advanced Care Planning: FULL CODE  I have had goals of care discussion, advanced directive and code status with patient/family today.  I have spent 30 minutes with this patient. Over 50% of the encounter was spent in counseling on and coordination of patient care. The above recommendations were discussed with the patient. The patient and or family had all questions answered and expressed understanding of the plan.

## 2022-08-26 NOTE — H&P ADULT - NSHPPHYSICALEXAM_GEN_ALL_CORE
General: NAD, comfortable  CV: RRR  Chest: CTAB  Abdomen: soft, non-distended  Extremities: no edema, cyanosis, clubbing

## 2022-08-26 NOTE — H&P ADULT - TIME BILLING
time spent evaluating and treating the patient's acute illness as well as time spent reviewing labs, radiology, discussing goals of care with patient and/or patient's family, and discussing the case with a multidisciplinary team.

## 2022-08-26 NOTE — H&P ADULT - REASON FOR ADMISSION
Pt aware of message below and verbalized understanding. No further questions or concerns from pt at this time. Palpitations

## 2022-08-26 NOTE — H&P ADULT - NSHPLABSRESULTS_GEN_ALL_CORE
LABS:                          13.2   6.01  )-----------( 251      ( 25 Aug 2022 20:36 )             39.4     08-25    143  |  105  |  17  ----------------------------<  188<H>  4.2   |  24  |  0.7    Ca    9.5      25 Aug 2022 20:36    TPro  7.3  /  Alb  4.8  /  TBili  <0.2  /  DBili  x   /  AST  24  /  ALT  22  /  AlkPhos  94  08-25    LIVER FUNCTIONS - ( 25 Aug 2022 20:36 )  Alb: 4.8 g/dL / Pro: 7.3 g/dL / ALK PHOS: 94 U/L / ALT: 22 U/L / AST: 24 U/L / GGT: x

## 2022-08-26 NOTE — H&P ADULT - HISTORY OF PRESENT ILLNESS
66 yo F patient with a PMH of HTN and pulmonary sarcoidosis presents to the ED for palpitations.    Patient states that she started having these palpitations earlier today, appeared suddenly, without any precipitating/ relieving factors, with no chest pain, shortness of breath, lightheadedness, or syncope.    Patient states also that she felt tingling sensation in her fingers, as well as a globus sensation; questionable depersonalization.  Patient says that she gets sometimes heat intolerance, without any other hyperthyroid symptoms.    In the ED, the patient was tachycardic in the 110s, BP= 160/98.  Labs were not significant, patient received in the ED IV Mg.  CT angio of the coronary arteries showed normal coronary arteries, ECG revealed sinus tachycardia.    Patient is admitted to med-telemetry.

## 2022-08-26 NOTE — PROGRESS NOTE ADULT - SUBJECTIVE AND OBJECTIVE BOX
pt seen and examined.         ROS: no cp, no sob, no n/v, no fever    PAST MEDICAL & SURGICAL HISTORY:  Sarcoidosis      Hypertension      High blood cholesterol      History of ankle surgery          Vital Signs Last 24 Hrs  T(C): 36.8 (26 Aug 2022 07:23), Max: 37 (25 Aug 2022 20:53)  T(F): 98.2 (26 Aug 2022 07:23), Max: 98.6 (25 Aug 2022 20:53)  HR: 69 (26 Aug 2022 07:23) (69 - 111)  BP: 136/69 (26 Aug 2022 07:23) (120/69 - 160/98)  BP(mean): --  RR: 18 (26 Aug 2022 07:23) (18 - 18)  SpO2: 97% (26 Aug 2022 07:23) (95% - 97%)    Parameters below as of 26 Aug 2022 07:23  Patient On (Oxygen Delivery Method): room air        physical exam  constitutional NAD, AAOX3, Respiratory  lungs CTA, CVS heart RRR, GI: abdomen Soft NT, ND, BS+, skin: intact  neuro exam Motor, sensory and CN normal, no deficit     MEDICATIONS  (STANDING):  amLODIPine   Tablet 5 milliGRAM(s) Oral once  aspirin  Oral Chewable Tab - Peds 81 milliGRAM(s) Chew daily  enoxaparin Injectable 40 milliGRAM(s) SubCutaneous every 24 hours    MEDICATIONS  (PRN):  albuterol/ipratropium for Nebulization 3 milliLiter(s) Nebulizer every 12 hours PRN Bronchospasm                        11.3   4.69  )-----------( 207      ( 26 Aug 2022 06:16 )             33.0     08-26    141  |  107  |  11  ----------------------------<  109<H>  4.4   |  27  |  0.6<L>    Ca    8.7      26 Aug 2022 06:16  Mg     2.3     08-26    TPro  6.1  /  Alb  3.9  /  TBili  0.3  /  DBili  x   /  AST  18  /  ALT  20  /  AlkPhos  68  08-26      COVID-19 PCR: NotDetec (08-26-22 @ 00:20)  COVID-19 PCR: NotDetec (08-12-22 @ 20:00)      CARDIAC MARKERS ( 25 Aug 2022 20:36 )  x     / <0.01 ng/mL / x     / x     / x        < from: 12 Lead ECG (08.26.22 @ 10:17) >  Normal sinus rhythm  Cannot rule out Anterior infarct , age undetermined  Abnormal ECG      < end of copied text >    a/p    66 yo F patient with a PMH of HTN and pulmonary sarcoidosis presents to the ED for palpitations.    # palpitation, SVT, cont tele, cardio eval , check tsh   # HTN cont meds  # prolonged QTc, now resolved  # Pulm sarcoidosis, outpt followup     #Progress Note Handoff  Pending (specify):  Consults cardio , tsh   Family discussion: erick pt   Disposition: Home

## 2022-08-26 NOTE — H&P ADULT - ASSESSMENT
66 yo F patient with a PMH of HTN and pulmonary sarcoidosis presents to the ED for palpitations.    # Palpitations    Began on the day of the presentation, no red flags  Unclear if panic attack might have caused it  No electrolyte abnormalities, and EKG showed sinus tachycardia in the 110s  Ordered TSH  Admitted to [facility] tele  Check for any tachyarrhythmias on tele    # HTN    C/w lisinopril and amlodipine    # Sarcoidosis    Not on steroids    DVT ppx: lovenox 40 mg  Diet: DASH  GI ppx: not indicated  Activity: ambulate as tolerated  Full code

## 2022-08-26 NOTE — H&P ADULT - NSHPREVIEWOFSYSTEMS_GEN_ALL_CORE
Negative except as above Review of Systems:  •	CONSTITUTIONAL - No fever, No diaphoresis, No weight change  •	SKIN - No rash  •	HEMATOLOGIC - No abnormal bleeding or bruising  •	EYES - No eye pain, No blurred vision  •	ENT - No change in hearing, No sore throat, No neck pain, No rhinorrhea, No ear pain  •	RESPIRATORY - No shortness of breath, No cough  •	CARDIAC -No chest pain, No palpitations  •	GI - No abdominal pain, No nausea, No vomiting, No diarrhea, No constipation, No bright red blood per rectum or melena. No flank pain  •             - No dysuria, frequency, hematuria.   •	ENDO - No polydypsia, No polyuria, No heat/cold intolerance  •	MUSCULOSKELETAL - No joint paint, No swelling, No back pain  •	NEUROLOGIC - No numbness, No focal weakness, No headache, No dizziness  All other systems negative, unless specified in HPI

## 2022-08-26 NOTE — H&P ADULT - ATTENDING COMMENTS
66 yo F patient with a PMH of HTN and pulmonary sarcoidosis presents to the ED for palpitations associated with  chest tightness and lightheadedness. Of note patient had  a similar Episode of  palpitations about a week ago  and  was sent to [facility].  CCTA  Unremarkable     VITAL SIGNS: AFebrile, vital signs stable  CONSTITUTIONAL: Well-developed; well-nourished; in no acute distress. Obese   SKIN: Skin exam is warm and dry, no acute rash.  HEAD: Normocephalic; atraumatic.  EYES: Pupils equal round reactive to light, Extraocular movements intact; conjunctiva and sclera clear.  ENT: No nasal discharge; airway clear. Moist mucus membranes.  NECK: Supple; non tender. No rigidity  CARD: Regular rate and rhythm. Normal S1, S2; no murmurs, gallops, or rubs.  RESP: Lungs clear to auscultation bilaterally. No wheezes, rales or rhonchi.  ABD: Abdomen soft; non-tender; non-distended; no hepatosplenomegaly. No costovertebral angle tenderness.   EXT: Normal ROM. No clubbing, cyanosis or edema. No calf tenderness to palpation.  NEURO: Alert and oriented x 3. No focal deficits.  PSYCH: Cooperative, appropriate.     `< from: 12 Lead ECG (08.25.22 @ 19:41) >  Diagnosis Line Sinus tachycardia with short GA  Low voltage QRS  Cannot rule out Anterior infarct , age undetermined  Prolonged QT  Abnormal ECG        IMPRESSION   Chest Palpitation  Unknown Etiology   Prolong Qt   Hx HTN   Hx Sarcoidosis     Plan  f/u TSH   Tele monitoring   Echo   Keep Mag above 2  correct electrolytes   Consider EP for Possible Mcot if tele unremarkable     #Progress Note Handoff  Pending (specify):  Consults_________, Tests________, Test Results_______, Other_________  Disposition:  48 hour D/C  Patient Seen at Bedside___________08/26________ 66 yo F patient with a PMH of HTN and pulmonary sarcoidosis presents to the ED for palpitations associated with  chest tightness and lightheadedness. Of note patient had  a similar Episode of  palpitations about a week ago  and  was sent to [facility].  CCTA  Unremarkable     VITAL SIGNS: AFebrile, vital signs stable  CONSTITUTIONAL: Well-developed; well-nourished; in no acute distress. Obese   SKIN: Skin exam is warm and dry, no acute rash.  HEAD: Normocephalic; atraumatic.  EYES: Pupils equal round reactive to light, Extraocular movements intact; conjunctiva and sclera clear.  ENT: No nasal discharge; airway clear. Moist mucus membranes.  NECK: Supple; non tender. No rigidity  CARD: Regular rate and rhythm. Normal S1, S2; no murmurs, gallops, or rubs.  RESP: Lungs clear to auscultation bilaterally. No wheezes, rales or rhonchi.  ABD: Abdomen soft; non-tender; non-distended; no hepatosplenomegaly. No costovertebral angle tenderness.   EXT: Normal ROM. No clubbing, cyanosis or edema. No calf tenderness to palpation.  NEURO: Alert and oriented x 3. No focal deficits.  PSYCH: Cooperative, appropriate.     `< from: 12 Lead ECG (08.25.22 @ 19:41) >  Diagnosis Line Sinus tachycardia with short WA  Low voltage QRS  Cannot rule out Anterior infarct , age undetermined  Prolonged QT  Abnormal ECG        IMPRESSION   Chest Palpitation  Unknown Etiology   Prolong Qt   Hx HTN   Hx Sarcoidosis     Plan  f/u TSH   Tele monitoring   Echo   Keep Mag above 2  correct electrolytes   Avoid qt prolonging agents  continue home medication unless contraindicated  Consider EP for Possible Mcot if tele unremarkable     #Progress Note Handoff  Pending (specify):  Consults_________, Tests________, Test Results_______, Other_________  Disposition:  48 hour D/C  Patient Seen at Bedside___________08/26________

## 2022-08-27 LAB
ANION GAP SERPL CALC-SCNC: 11 MMOL/L — SIGNIFICANT CHANGE UP (ref 7–14)
BASOPHILS # BLD AUTO: 0.01 K/UL — SIGNIFICANT CHANGE UP (ref 0–0.2)
BASOPHILS NFR BLD AUTO: 0.2 % — SIGNIFICANT CHANGE UP (ref 0–1)
BUN SERPL-MCNC: 11 MG/DL — SIGNIFICANT CHANGE UP (ref 10–20)
CALCIUM SERPL-MCNC: 8.7 MG/DL — SIGNIFICANT CHANGE UP (ref 8.5–10.1)
CHLORIDE SERPL-SCNC: 105 MMOL/L — SIGNIFICANT CHANGE UP (ref 98–110)
CO2 SERPL-SCNC: 26 MMOL/L — SIGNIFICANT CHANGE UP (ref 17–32)
CREAT SERPL-MCNC: 0.7 MG/DL — SIGNIFICANT CHANGE UP (ref 0.7–1.5)
EGFR: 94 ML/MIN/1.73M2 — SIGNIFICANT CHANGE UP
EOSINOPHIL # BLD AUTO: 0.19 K/UL — SIGNIFICANT CHANGE UP (ref 0–0.7)
EOSINOPHIL NFR BLD AUTO: 3.6 % — SIGNIFICANT CHANGE UP (ref 0–8)
GLUCOSE SERPL-MCNC: 107 MG/DL — HIGH (ref 70–99)
HCT VFR BLD CALC: 36 % — LOW (ref 37–47)
HGB BLD-MCNC: 11.9 G/DL — LOW (ref 12–16)
IMM GRANULOCYTES NFR BLD AUTO: 0.2 % — SIGNIFICANT CHANGE UP (ref 0.1–0.3)
LYMPHOCYTES # BLD AUTO: 1.98 K/UL — SIGNIFICANT CHANGE UP (ref 1.2–3.4)
LYMPHOCYTES # BLD AUTO: 37.4 % — SIGNIFICANT CHANGE UP (ref 20.5–51.1)
MAGNESIUM SERPL-MCNC: 2.2 MG/DL — SIGNIFICANT CHANGE UP (ref 1.8–2.4)
MCHC RBC-ENTMCNC: 26.9 PG — LOW (ref 27–31)
MCHC RBC-ENTMCNC: 33.1 G/DL — SIGNIFICANT CHANGE UP (ref 32–37)
MCV RBC AUTO: 81.3 FL — SIGNIFICANT CHANGE UP (ref 81–99)
MONOCYTES # BLD AUTO: 0.46 K/UL — SIGNIFICANT CHANGE UP (ref 0.1–0.6)
MONOCYTES NFR BLD AUTO: 8.7 % — SIGNIFICANT CHANGE UP (ref 1.7–9.3)
NEUTROPHILS # BLD AUTO: 2.64 K/UL — SIGNIFICANT CHANGE UP (ref 1.4–6.5)
NEUTROPHILS NFR BLD AUTO: 49.9 % — SIGNIFICANT CHANGE UP (ref 42.2–75.2)
NRBC # BLD: 0 /100 WBCS — SIGNIFICANT CHANGE UP (ref 0–0)
PHOSPHATE SERPL-MCNC: 3.9 MG/DL — SIGNIFICANT CHANGE UP (ref 2.1–4.9)
PLATELET # BLD AUTO: 221 K/UL — SIGNIFICANT CHANGE UP (ref 130–400)
POTASSIUM SERPL-MCNC: 4.4 MMOL/L — SIGNIFICANT CHANGE UP (ref 3.5–5)
POTASSIUM SERPL-SCNC: 4.4 MMOL/L — SIGNIFICANT CHANGE UP (ref 3.5–5)
RBC # BLD: 4.43 M/UL — SIGNIFICANT CHANGE UP (ref 4.2–5.4)
RBC # FLD: 13.2 % — SIGNIFICANT CHANGE UP (ref 11.5–14.5)
SODIUM SERPL-SCNC: 142 MMOL/L — SIGNIFICANT CHANGE UP (ref 135–146)
WBC # BLD: 5.29 K/UL — SIGNIFICANT CHANGE UP (ref 4.8–10.8)
WBC # FLD AUTO: 5.29 K/UL — SIGNIFICANT CHANGE UP (ref 4.8–10.8)

## 2022-08-27 PROCEDURE — 93010 ELECTROCARDIOGRAM REPORT: CPT

## 2022-08-27 PROCEDURE — 99233 SBSQ HOSP IP/OBS HIGH 50: CPT

## 2022-08-27 RX ADMIN — Medication 81 MILLIGRAM(S): at 11:14

## 2022-08-27 RX ADMIN — GABAPENTIN 100 MILLIGRAM(S): 400 CAPSULE ORAL at 21:52

## 2022-08-27 NOTE — PROGRESS NOTE ADULT - SUBJECTIVE AND OBJECTIVE BOX
pt seen and examined.   feels better no palpitation     ROS: no cp, no sob, no n/v, no fever    PAST MEDICAL & SURGICAL HISTORY:  Sarcoidosis      Hypertension      High blood cholesterol      History of ankle surgery      Vital Signs Last 24 Hrs  T(C): 37.3 (27 Aug 2022 11:22), Max: 37.3 (27 Aug 2022 11:22)  T(F): 99.1 (27 Aug 2022 11:22), Max: 99.1 (27 Aug 2022 11:22)  HR: 76 (27 Aug 2022 11:22) (63 - 76)  BP: 122/59 (27 Aug 2022 11:22) (111/60 - 131/79)  BP(mean): --  RR: 18 (27 Aug 2022 11:22) (18 - 19)  SpO2: 97% (27 Aug 2022 11:22) (96% - 97%)    Parameters below as of 27 Aug 2022 11:22  Patient On (Oxygen Delivery Method): room air    physical exam  constitutional NAD, AAOX3, Respiratory  lungs CTA, CVS heart RRR, GI: abdomen Soft NT, ND, BS+, skin: intact  neuro exam Motor, sensory and CN normal, no deficit     MEDICATIONS  (STANDING):  amLODIPine   Tablet 5 milliGRAM(s) Oral once  aspirin  Oral Chewable Tab - Peds 81 milliGRAM(s) Chew daily  enoxaparin Injectable 40 milliGRAM(s) SubCutaneous every 24 hours  gabapentin 100 milliGRAM(s) Oral at bedtime    MEDICATIONS  (PRN):  albuterol/ipratropium for Nebulization 3 milliLiter(s) Nebulizer every 12 hours PRN Bronchospasm                            11.9   5.29  )-----------( 221      ( 27 Aug 2022 05:36 )             36.0     08-27    142  |  105  |  11  ----------------------------<  107<H>  4.4   |  26  |  0.7    Ca    8.7      27 Aug 2022 05:36  Phos  3.9     08-27  Mg     2.2     08-27    TPro  6.1  /  Alb  3.9  /  TBili  0.3  /  DBili  x   /  AST  18  /  ALT  20  /  AlkPhos  68  08-26      COVID-19 PCR: NotDetec (08-26-22 @ 00:20)  COVID-19 PCR: NotDetec (08-12-22 @ 20:00)      CARDIAC MARKERS ( 26 Aug 2022 16:28 )  x     / <0.01 ng/mL / x     / x     / x      CARDIAC MARKERS ( 26 Aug 2022 11:18 )  x     / <0.01 ng/mL / x     / x     / x      CARDIAC MARKERS ( 25 Aug 2022 20:36 )  x     / <0.01 ng/mL / x     / x     / x        < from: 12 Lead ECG (08.27.22 @ 09:21) >  Normal sinus rhythm  Low voltage QRS  Cannot rule out Anterior infarct , age undetermined  Abnormal ECG    < end of copied text >    < from: TTE Echo Complete w/o Contrast w/ Doppler (08.26.22 @ 06:36) >   1. Mildly decreased global left ventricular systolic function.   2. LV Ejection Fraction by Conti's Method with a biplane EF of 50 %.   3. Normal left ventricular internal cavity size.   4. Mildly reduced global (non-regional) systolic function. The mean   global longitudinal peak strain by speckle tracking is -17.1% which is   borderline normal.   5. Normal left atrial size.   6. Normal right atrial size.   7. Trivial pericardial effusion.   8. Trace mitral valve regurgitation.   9. LA volume Index is 32.7 ml/m² ml/m2.    < end of copied text >      a/p    66 yo F patient with a PMH of HTN and pulmonary sarcoidosis presents to the ED for palpitations.    Patient states that she started having these palpitations earlier today, appeared suddenly, without any precipitating/ relieving factors, with no chest pain, shortness of breath, lightheadedness, or syncope.    Patient states also that she felt tingling sensation in her fingers, as well as a globus sensation; questionable depersonalization.  Patient says that she gets sometimes heat intolerance, without any other hyperthyroid symptoms.    In the ED, the patient was tachycardic in the 110s, BP= 160/98.  Labs were not significant, patient received in the ED IV Mg.  CT angio of the coronary arteries showed normal coronary arteries, ECG revealed sinus tachycardia.    # palpitation  pt is feeling better, no arrhythmia on tele   awaiting cardio   Thyroid Stimulating Hormone, Serum: 4.23 uIU/mL (08.26.22 @ 06:16)    # pulm sarcoidosis not on tx  outpt fu     #Progress Note Handoff  Pending (specify):  Consults_cardio   Family discussion: dw pt   Disposition: Home in am

## 2022-08-28 LAB
ANION GAP SERPL CALC-SCNC: 10 MMOL/L — SIGNIFICANT CHANGE UP (ref 7–14)
BASOPHILS # BLD AUTO: 0.02 K/UL — SIGNIFICANT CHANGE UP (ref 0–0.2)
BASOPHILS NFR BLD AUTO: 0.4 % — SIGNIFICANT CHANGE UP (ref 0–1)
BUN SERPL-MCNC: 18 MG/DL — SIGNIFICANT CHANGE UP (ref 10–20)
CALCIUM SERPL-MCNC: 8.8 MG/DL — SIGNIFICANT CHANGE UP (ref 8.5–10.1)
CHLORIDE SERPL-SCNC: 106 MMOL/L — SIGNIFICANT CHANGE UP (ref 98–110)
CO2 SERPL-SCNC: 26 MMOL/L — SIGNIFICANT CHANGE UP (ref 17–32)
CREAT SERPL-MCNC: 0.7 MG/DL — SIGNIFICANT CHANGE UP (ref 0.7–1.5)
EGFR: 94 ML/MIN/1.73M2 — SIGNIFICANT CHANGE UP
EOSINOPHIL # BLD AUTO: 0.18 K/UL — SIGNIFICANT CHANGE UP (ref 0–0.7)
EOSINOPHIL NFR BLD AUTO: 3.8 % — SIGNIFICANT CHANGE UP (ref 0–8)
GLUCOSE SERPL-MCNC: 97 MG/DL — SIGNIFICANT CHANGE UP (ref 70–99)
HCT VFR BLD CALC: 35.8 % — LOW (ref 37–47)
HGB BLD-MCNC: 11.8 G/DL — LOW (ref 12–16)
IMM GRANULOCYTES NFR BLD AUTO: 0.2 % — SIGNIFICANT CHANGE UP (ref 0.1–0.3)
LYMPHOCYTES # BLD AUTO: 1.53 K/UL — SIGNIFICANT CHANGE UP (ref 1.2–3.4)
LYMPHOCYTES # BLD AUTO: 32.2 % — SIGNIFICANT CHANGE UP (ref 20.5–51.1)
MAGNESIUM SERPL-MCNC: 2 MG/DL — SIGNIFICANT CHANGE UP (ref 1.8–2.4)
MCHC RBC-ENTMCNC: 27 PG — SIGNIFICANT CHANGE UP (ref 27–31)
MCHC RBC-ENTMCNC: 33 G/DL — SIGNIFICANT CHANGE UP (ref 32–37)
MCV RBC AUTO: 81.9 FL — SIGNIFICANT CHANGE UP (ref 81–99)
MONOCYTES # BLD AUTO: 0.35 K/UL — SIGNIFICANT CHANGE UP (ref 0.1–0.6)
MONOCYTES NFR BLD AUTO: 7.4 % — SIGNIFICANT CHANGE UP (ref 1.7–9.3)
NEUTROPHILS # BLD AUTO: 2.66 K/UL — SIGNIFICANT CHANGE UP (ref 1.4–6.5)
NEUTROPHILS NFR BLD AUTO: 56 % — SIGNIFICANT CHANGE UP (ref 42.2–75.2)
NRBC # BLD: 0 /100 WBCS — SIGNIFICANT CHANGE UP (ref 0–0)
PHOSPHATE SERPL-MCNC: 4 MG/DL — SIGNIFICANT CHANGE UP (ref 2.1–4.9)
PLATELET # BLD AUTO: 211 K/UL — SIGNIFICANT CHANGE UP (ref 130–400)
POTASSIUM SERPL-MCNC: 4.6 MMOL/L — SIGNIFICANT CHANGE UP (ref 3.5–5)
POTASSIUM SERPL-SCNC: 4.6 MMOL/L — SIGNIFICANT CHANGE UP (ref 3.5–5)
RBC # BLD: 4.37 M/UL — SIGNIFICANT CHANGE UP (ref 4.2–5.4)
RBC # FLD: 13.3 % — SIGNIFICANT CHANGE UP (ref 11.5–14.5)
SODIUM SERPL-SCNC: 142 MMOL/L — SIGNIFICANT CHANGE UP (ref 135–146)
WBC # BLD: 4.75 K/UL — LOW (ref 4.8–10.8)
WBC # FLD AUTO: 4.75 K/UL — LOW (ref 4.8–10.8)

## 2022-08-28 PROCEDURE — 99223 1ST HOSP IP/OBS HIGH 75: CPT

## 2022-08-28 PROCEDURE — 99221 1ST HOSP IP/OBS SF/LOW 40: CPT

## 2022-08-28 PROCEDURE — 72110 X-RAY EXAM L-2 SPINE 4/>VWS: CPT | Mod: 26

## 2022-08-28 PROCEDURE — 99233 SBSQ HOSP IP/OBS HIGH 50: CPT

## 2022-08-28 RX ORDER — LIDOCAINE 4 G/100G
1 CREAM TOPICAL ONCE
Refills: 0 | Status: COMPLETED | OUTPATIENT
Start: 2022-08-28 | End: 2022-08-28

## 2022-08-28 RX ADMIN — GABAPENTIN 100 MILLIGRAM(S): 400 CAPSULE ORAL at 22:44

## 2022-08-28 RX ADMIN — LIDOCAINE 1 PATCH: 4 CREAM TOPICAL at 11:33

## 2022-08-28 RX ADMIN — LIDOCAINE 1 PATCH: 4 CREAM TOPICAL at 18:05

## 2022-08-28 RX ADMIN — Medication 81 MILLIGRAM(S): at 11:33

## 2022-08-28 NOTE — CONSULT NOTE ADULT - ASSESSMENT
Impression   In urgent care ECG showed a narrow complex tachycardia, regular with no evident P waves at a rate of ~ 180 - Differential includes a fib, a flutter, atrial tachycardia and sinus tachycardia, AVRT and AVNRT  No further episodes on tele   CCTA without CAD   Mildly decreased function on echo - EF 50%    Recommendations   - Continue monitoring on tele   - Can start low dose metoprolol BID   - If no events while in the hospital, would benefit from an EP evaluation for MCOT vs loop   - Check TSH     Discussed with attending

## 2022-08-28 NOTE — CONSULT NOTE ADULT - TIME BILLING
SVT, palpitations
Interviewed and examined patient. Reviewed hospital course, ECG, TTE, CXR, tele, lab work, and medications. Discussed plan with patient, EP and family member.

## 2022-08-28 NOTE — PROGRESS NOTE ADULT - SUBJECTIVE AND OBJECTIVE BOX
pt seen and examined.   feels better. wants to go home but daughter is asking if pt can have her workup as inpt, pt agrees         ROS: no cp, no sob, no n/v, no fever    PAST MEDICAL & SURGICAL HISTORY:  Sarcoidosis      Hypertension      High blood cholesterol      History of ankle surgery          Vital Signs Last 24 Hrs  T(C): 37.1 (28 Aug 2022 11:53), Max: 37.1 (28 Aug 2022 11:53)  T(F): 98.7 (28 Aug 2022 11:53), Max: 98.7 (28 Aug 2022 11:53)  HR: 74 (28 Aug 2022 11:53) (62 - 74)  BP: 154/76 (28 Aug 2022 11:53) (124/55 - 154/76)  BP(mean): --  RR: 18 (28 Aug 2022 11:53) (18 - 18)  SpO2: 96% (28 Aug 2022 11:53) (96% - 96%)    Parameters below as of 28 Aug 2022 11:53  Patient On (Oxygen Delivery Method): room air        physical exam  constitutional NAD, AAOX3, Respiratory  lungs CTA, CVS heart RRR, GI: abdomen Soft NT, ND, BS+, skin: intact  neuro exam Motor, sensory and CN normal, no deficit     MEDICATIONS  (STANDING):  aspirin  Oral Chewable Tab - Peds 81 milliGRAM(s) Chew daily  enoxaparin Injectable 40 milliGRAM(s) SubCutaneous every 24 hours  gabapentin 100 milliGRAM(s) Oral at bedtime    MEDICATIONS  (PRN):  albuterol/ipratropium for Nebulization 3 milliLiter(s) Nebulizer every 12 hours PRN Bronchospasm      CAPILLARY BLOOD GLUCOSE                              11.8   4.75  )-----------( 211      ( 28 Aug 2022 07:08 )             35.8     08-28    142  |  106  |  18  ----------------------------<  97  4.6   |  26  |  0.7    Ca    8.8      28 Aug 2022 07:08  Phos  4.0     08-28  Mg     2.0     08-28        COVID-19 PCR: NotDetec (08-26-22 @ 00:20)  COVID-19 PCR: NotDetec (08-12-22 @ 20:00)      CARDIAC MARKERS ( 26 Aug 2022 16:28 )  x     / <0.01 ng/mL / x     / x     / x        < from: CT Angio Heart and Coronaries w/ IV Cont (08.24.22 @ 11:54) >  1. Normal coronary arteries.    CAD-RADS 0.    < end of copied text >    < from: TTE Echo Complete w/o Contrast w/ Doppler (08.26.22 @ 06:36) >   1. Mildly decreased global left ventricular systolic function.   2. LV Ejection Fraction by Conti's Method with a biplane EF of 50 %.   3. Normal left ventricular internal cavity size.   4. Mildly reduced global (non-regional) systolic function. The mean   global longitudinal peak strain by speckle tracking is -17.1% which is   borderline normal.   5. Normal left atrial size.   6. Normal right atrial size.   7. Trivial pericardial effusion.   8. Trace mitral valve regurgitation.   9. LA volume Index is 32.7 ml/m² ml/m2.    < end of copied text >        a/p    66 yo F patient with a PMH of HTN and pulmonary sarcoidosis presents to the ED for palpitations.    also today she has complaint of back pain, ( per pt it is not new) no radiculopathy    # palpitation  tele neg  vitals stable   cardio and EP notes appreciated  Thyroid Stimulating Hormone, Serum: 4.23 uIU/mL (08.26.22 @ 06:16)  per EP : MRI cardiac to rule out cardiac sarcoidosis    # cardio notes appreciated but pt's HR and BP have been very good while in the hospital and she has not had any tachycardia, in view of current vitals will not start bb as of now. erick EP and they agree with not starting beta blockers at this time     # pulm sarcoidosis not on tx  outpt fu     # back pain, xray LS spine,   add lidocaine patch to lower back      #Progress Note Handoff  Pending (specify):  cardiac MRI , poss MCOT prior to dc   Family discussion: erick pt   Disposition: home if cleared by EP

## 2022-08-28 NOTE — CONSULT NOTE ADULT - SUBJECTIVE AND OBJECTIVE BOX
Patient is a 67y old  Female who presents with a chief complaint of Palpitations (28 Aug 2022 00:11)    HPI:    66 yo F patient with a PMH of HTN and pulmonary sarcoidosis presents to the ED for palpitations.  Pt states she first had palpitations 2 weeks ago that lasted for several hours.  Her HR was in the 160s at that time.  She told her daughter the next day - her dtr brought her to the ED [facility] but by then her HR was normal and she was sent home.  She again had palpitations at rest 4 days ago.  HR was 190.  She went to urgent care - EKG was SVT at 190BPM.  She was sent to the ED.  EKG in the ED was ST at 125. Pt reports chest tightness with these episodes.      She was admitted to tele for w/u.  She has been in NSR on tele.  CCTA was negative.  Echo showed EF 50%.  Of note, dtr reports pt snores and occasionally stops breathing when she is asleep. She has never had a sleep study.  She has had pulm sarcoidosis for 15 years - she follows with pulmonolgy.      PAST MEDICAL & SURGICAL HISTORY:  Sarcoidosis    Hypertension  High blood cholesterol    History of ankle surgery    PREVIOUS DIAGNOSTIC TESTING:      ECHO  FINDINGS:  < from: TTE Echo Complete w/o Contrast w/ Doppler (08.26.22 @ 06:36) >  Summary:   1. Mildly decreased global left ventricular systolic function.   2. LV Ejection Fraction by Conti's Method with a biplane EF of 50 %.   3. Normal left ventricular internal cavity size.   4. Mildly reduced global (non-regional) systolic function. The mean   global longitudinal peak strain by speckle tracking is -17.1% which is   borderline normal.   5. Normal left atrial size.   6. Normal right atrial size.   7. Trivial pericardial effusion.   8. Trace mitral valve regurgitation.   9. LA volume Index is 32.7 ml/m² ml/m2.    < end of copied text >      CHEST CT PULMONARY ANGIO with IV Contrast:  FINDINGS:  < from: CT Angio Heart and Coronaries w/ IV Cont (08.24.22 @ 11:54) >  IMPRESSION:    1. Normal coronary arteries.    < end of copied text >      MEDICATIONS  (STANDING):  amLODIPine   Tablet 5 milliGRAM(s) Oral once  aspirin  Oral Chewable Tab - Peds 81 milliGRAM(s) Chew daily  enoxaparin Injectable 40 milliGRAM(s) SubCutaneous every 24 hours  gabapentin 100 milliGRAM(s) Oral at bedtime    MEDICATIONS  (PRN):  albuterol/ipratropium for Nebulization 3 milliLiter(s) Nebulizer every 12 hours PRN Bronchospasm    FAMILY HISTORY: HTN    SOCIAL HISTORY: lives with her  who has a hx of TBI    CIGARETTES: denies    ALCOHOL: denies    Past Surgical History:    Allergies:    No Known Allergies    REVIEW OF SYSTEMS:  As per HPI. Remainder 10 point ROS is negative    Vital Signs Last 24 Hrs  T(C): 36.8 (28 Aug 2022 05:10), Max: 36.9 (27 Aug 2022 20:00)  T(F): 98.2 (28 Aug 2022 05:10), Max: 98.5 (27 Aug 2022 20:00)  HR: 62 (28 Aug 2022 05:10) (62 - 71)  BP: 124/55 (28 Aug 2022 05:10) (124/55 - 135/68)  BP(mean): --  RR: 18 (27 Aug 2022 20:00) (18 - 18)  SpO2: --    PHYSICAL EXAM:    GENERAL: In no apparent distress, well nourished, and hydrated.  HEART: Regular rate and rhythm; No murmurs, rubs, or gallops.  PULMONARY: Clear to auscultation and perfusion.  No rales, wheezing, or rhonchi bilaterally.  ABDOMEN: Soft, Nontender, Nondistended; Bowel sounds present  EXTREMITIES:  2+ Peripheral Pulses, No clubbing, cyanosis, or edema  NEUROLOGICAL: Grossly nonfocal    INTERPRETATION OF TELEMETRY:  NSR with HR in the 60s    ECG:  < from: 12 Lead ECG (08.27.22 @ 09:21) >  Ventricular Rate 79 BPM    Atrial Rate 79 BPM    P-R Interval 160 ms    QRS Duration 72 ms    Q-T Interval 414 ms    QTC Calculation(Bazett) 474 ms    P Axis 43 degrees    R Axis -21 degrees    T Axis 19 degrees    Diagnosis Line Normal sinus rhythm  Low voltage QRS  Cannot rule out Anterior infarct , age undetermined  Abnormal ECG    < end of copied text >      I&O's Detail    LABS:                        11.8   4.75  )-----------( 211      ( 28 Aug 2022 07:08 )             35.8     08-28    142  |  106  |  18  ----------------------------<  97  4.6   |  26  |  0.7    Ca    8.8      28 Aug 2022 07:08  Phos  4.0     08-28  Mg     2.0     08-28      CARDIAC MARKERS ( 26 Aug 2022 16:28 )  x     / <0.01 ng/mL / x     / x     / x        RADIOLOGY & ADDITIONAL STUDIES:
  HPI:  68 yo F patient with a PMH of HTN and pulmonary sarcoidosis presents to the ED for palpitations.    Patient states that she started having these palpitations earlier today, appeared suddenly, without any precipitating/ relieving factors, with no chest pain, shortness of breath, lightheadedness, or syncope.    Patient states also that she felt tingling sensation in her fingers, as well as a globus sensation; questionable depersonalization.  Patient says that she gets sometimes heat intolerance, without any other hyperthyroid symptoms.    In the ED, the patient was tachycardic in the 110s, BP= 160/98.  Labs were not significant, patient received in the ED IV Mg.  CT angio of the coronary arteries showed normal coronary arteries, ECG revealed sinus tachycardia.    Patient is [facility] to [facility]. (26 Aug 2022 02:37)    No known CAD.   Had been having these episodes of palpitations for 2 weeks. Usually start and end abruptly. Associated with dizziness and pre syncope   She went to urgent care on the day of presentation because symptoms lasted longer than usual.   In urgent care ECG showed a narrow complex tachycardia, regular with no evident P waves at a rate of ~ 180 so she was sent to the ED.   In the ED she was in sinus tachycardia     PAST MEDICAL & SURGICAL HISTORY  Sarcoidosis    Hypertension    High blood cholesterol    History of ankle surgery    ALLERGIES:  No Known Allergies      MEDICATIONS:  amLODIPine   Tablet 5 milliGRAM(s) Oral once  aspirin  Oral Chewable Tab - Peds 81 milliGRAM(s) Chew daily  enoxaparin Injectable 40 milliGRAM(s) SubCutaneous every 24 hours  gabapentin 100 milliGRAM(s) Oral at bedtime    PRN:  albuterol/ipratropium for Nebulization 3 milliLiter(s) Nebulizer every 12 hours PRN      HOME MEDICATIONS:  Home Medications:  amLODIPine:  (12 Aug 2022 19:20)  aspirin 81 mg oral tablet, chewable:  (12 Aug 2022 19:20)  celecoxib 200 mg oral capsule: 1 cap(s) orally once a day (26 Aug 2022 10:59)  Fish Oil 1200 mg oral capsule: 1 cap(s) orally 3 times a day (26 Aug 2022 10:56)  gabapentin 100 mg oral tablet: 1 tab(s) orally once a day (26 Aug 2022 10:58)  losartan 50 mg oral tablet: 1 tab(s) orally once a day (26 Aug 2022 10:57)  Vitamin D3 50 mcg (2000 intl units) oral tablet: 1 tab(s) orally once a day (26 Aug 2022 11:00)      VITALS:   T(F): 98.5 ( @ 20:00), Max: 99.1 ( @ 11:22)  HR: 71 ( @ 20:00) (63 - 111)  BP: 135/68 ( @ 20:00) (111/60 - 160/98)  BP(mean): --  RR: 18 ( @ 20:00) (18 - 19)  SpO2: 97% ( @ 11:22) (95% - 97%)    I&O's Summary      REVIEW OF SYSTEMS:  CONSTITUTIONAL: No weakness, fevers or chills  HEENT: No visual changes, neck/ear pain  RESPIRATORY: No cough, sob  CARDIOVASCULAR: See HPI  GASTROINTESTINAL: No abdominal pain. No nausea, vomiting, diarrhea   GENITOURINARY: No dysuria, frequency or hematuria  NEUROLOGICAL: No new focal deficits  SKIN: No new rashes    PHYSICAL EXAM:  General: Not in distress.  Non-toxic appearing.   HEENT: EOMI  Cardio: regular, S1, S2, no murmur  Pulm: B/L BS.  No wheezing / crackles / rales  Abdomen: Soft, non-tender, non-distended. Normoactive bowel sounds  Extremities: No edema b/l le  Neuro: A&O x3. No focal deficits    LABS:                        11.9   5.29  )-----------( 221      ( 27 Aug 2022 05:36 )             36.0         142  |  105  |  11  ----------------------------<  107<H>  4.4   |  26  |  0.7    Ca    8.7      27 Aug 2022 05:36  Phos  3.9       Mg     2.2         TPro  6.1  /  Alb  3.9  /  TBili  0.3  /  DBili  x   /  AST  18  /  ALT  20  /  AlkPhos  68          CARDIAC MARKERS ( 26 Aug 2022 16:28 )  x     / <0.01 ng/mL / x     / x     / x      CARDIAC MARKERS ( 26 Aug 2022 11:18 )  x     / <0.01 ng/mL / x     / x     / x            Troponin trend:        COVID-19 PCR: NotDetec (26 Aug 2022 00:20)  COVID-19 PCR: NotDetec (12 Aug 2022 20:00)  COVID-19 PCR: Detected (16 Mar 2022 13:20)      IMAGING:  -CCTA:  < from: CT Angio Heart and Coronaries w/ IV Cont (22 @ 11:54) >  IMPRESSION:    1. Normal coronary arteries.    CAD-RADS 0.    2. Intrathoracic lymphadenopathy as well as perilymphatic nodularity,   findings compatible with the patient's known history of sarcoidosis. CT   of the chest in 3-6 months may be obtained to assess for stability.    < end of copied text >    < from: TTE Echo Complete w/o Contrast w/ Doppler (22 @ 06:36) >  Summary:   1. Mildly decreased global left ventricular systolic function.   2. LV Ejection Fraction by Conti's Method with a biplane EF of 50 %.   3. Normal left ventricular internal cavity size.   4. Mildly reduced global (non-regional) systolic function. The mean   global longitudinal peak strain by speckle tracking is -17.1% which is   borderline normal.   5. Normal left atrial size.   6. Normal right atrial size.   7. Trivial pericardial effusion.   8. Trace mitral valve regurgitation.   9. LA volume Index is 32.7 ml/m² ml/m2.    < end of copied text >      EC Lead ECG:   Ventricular Rate 79 BPM    Atrial Rate 79 BPM    P-R Interval 160 ms    QRS Duration 72 ms    Q-T Interval 414 ms    QTC Calculation(Bazett) 474 ms    P Axis 43 degrees    R Axis -21 degrees    T Axis 19 degrees    Diagnosis Line Normal sinus rhythm  Low voltage QRS  Cannot rule out Anterior infarct , age undetermined  Abnormal ECG    Confirmed by Mateo Avina ([id number]) on 2022 11:30:46 AM ( @ 09:21)      TELEMETRY EVENTS:    No events on tele

## 2022-08-28 NOTE — CONSULT NOTE ADULT - NS ATTEND AMEND GEN_ALL_CORE FT
Cardiologist: Dr. Aleta Alberto ([facility])    68 yo F with history of HTN and pulmonary sarcoidosis (never had cardiac MRI or FDG/PET) presents to the ED for palpitations. Initial episode of palpitations was 2 weeks ago and started at rest with HR up to 170s on pulse ox. Self terminated after ~ 2.5 hours. Did not seek medical attention until the next day. More recently, pt had another episode that started at rest with HR up to 190s and went to urgent care, found to have SVT and sent to ER. Episode terminated by the time she was in the ED. No prior episodes before 2 weeks ago    Rec  - Cardiac MRI to assess for possible cardiac sarcoidosis  - Start low dose BB (Metoprolol Tartrate 25mg PO BID)  - Monitor on tele  - 30 day MCOT on discharge  - Discussed outpatient ablation but will start with medications for now and taught patient maneuvers she can perform to abort SVT episodes  - Also needs pulm referral for outpt sleep study

## 2022-08-28 NOTE — CONSULT NOTE ADULT - ASSESSMENT
68 yo F patient with a PMH of HTN and pulmonary sarcoidosis presents to the ED for palpitations.  Pt had SVT while in urgent care    SVT  HTN  Pulm sarcoidosis    Plan  Recommend Cardiac MRI (dtr wants pt to say in the hospital to get MRI done) to evaluate for cardiac sarcoidosis  Metoprolol if BP/HR allow (Hr currently 62)  Pt needs an outpt sleep study  Will place MCOT on pt on day of discharge.   Cardiologist: Dr. Aleta Alberto ([facility])    68 yo F patient with a PMH of HTN and pulmonary sarcoidosis presents to the ED for palpitations.  Pt had SVT while in urgent care    SVT  HTN  Pulm sarcoidosis    Plan  Recommend Cardiac MRI (dtr wants pt to say in the hospital to get MRI done) to evaluate for cardiac sarcoidosis  Metoprolol if BP/HR allow (Hr currently 62)  Pt needs an outpt sleep study  Will place MCOT on pt on day of discharge.

## 2022-08-28 NOTE — CONSULT NOTE ADULT - ATTENDING COMMENTS
Agree with assessment and plan, unless otherwise documented below. In brief, Ms Damon is a 67yoF with PMHx of HTN and pulmonary sarcoidosis who presented to urgent care after 2 episodes of palpitations. She was found to be in SVT and was transferred to the ED. She self-converted to sinus. On tele, she has remained sinus. She has been asymptomatic.     #SVT  #Pulmonary sarcoidosis  #HTN    -Would start metoprolol succinate 25 daily for SVT  -Outpatient MCOT to be placed on day of discharge  -Discussed with patient and EP, patient opting to stay inpatient for cardiac MRI to evaluate for cardiac sarcoid. Please call cardiology consults when results available

## 2022-08-29 LAB
ANION GAP SERPL CALC-SCNC: 11 MMOL/L — SIGNIFICANT CHANGE UP (ref 7–14)
BASOPHILS # BLD AUTO: 0.02 K/UL — SIGNIFICANT CHANGE UP (ref 0–0.2)
BASOPHILS NFR BLD AUTO: 0.4 % — SIGNIFICANT CHANGE UP (ref 0–1)
BUN SERPL-MCNC: 17 MG/DL — SIGNIFICANT CHANGE UP (ref 10–20)
CALCIUM SERPL-MCNC: 8.6 MG/DL — SIGNIFICANT CHANGE UP (ref 8.5–10.1)
CHLORIDE SERPL-SCNC: 105 MMOL/L — SIGNIFICANT CHANGE UP (ref 98–110)
CO2 SERPL-SCNC: 27 MMOL/L — SIGNIFICANT CHANGE UP (ref 17–32)
CREAT SERPL-MCNC: 0.7 MG/DL — SIGNIFICANT CHANGE UP (ref 0.7–1.5)
EGFR: 94 ML/MIN/1.73M2 — SIGNIFICANT CHANGE UP
EOSINOPHIL # BLD AUTO: 0.17 K/UL — SIGNIFICANT CHANGE UP (ref 0–0.7)
EOSINOPHIL NFR BLD AUTO: 3.3 % — SIGNIFICANT CHANGE UP (ref 0–8)
GLUCOSE SERPL-MCNC: 101 MG/DL — HIGH (ref 70–99)
HCT VFR BLD CALC: 32.5 % — LOW (ref 37–47)
HGB BLD-MCNC: 11.1 G/DL — LOW (ref 12–16)
IMM GRANULOCYTES NFR BLD AUTO: 0.2 % — SIGNIFICANT CHANGE UP (ref 0.1–0.3)
LYMPHOCYTES # BLD AUTO: 1.44 K/UL — SIGNIFICANT CHANGE UP (ref 1.2–3.4)
LYMPHOCYTES # BLD AUTO: 28.2 % — SIGNIFICANT CHANGE UP (ref 20.5–51.1)
MAGNESIUM SERPL-MCNC: 2 MG/DL — SIGNIFICANT CHANGE UP (ref 1.8–2.4)
MCHC RBC-ENTMCNC: 27.8 PG — SIGNIFICANT CHANGE UP (ref 27–31)
MCHC RBC-ENTMCNC: 34.2 G/DL — SIGNIFICANT CHANGE UP (ref 32–37)
MCV RBC AUTO: 81.3 FL — SIGNIFICANT CHANGE UP (ref 81–99)
MONOCYTES # BLD AUTO: 0.45 K/UL — SIGNIFICANT CHANGE UP (ref 0.1–0.6)
MONOCYTES NFR BLD AUTO: 8.8 % — SIGNIFICANT CHANGE UP (ref 1.7–9.3)
NEUTROPHILS # BLD AUTO: 3.01 K/UL — SIGNIFICANT CHANGE UP (ref 1.4–6.5)
NEUTROPHILS NFR BLD AUTO: 59.1 % — SIGNIFICANT CHANGE UP (ref 42.2–75.2)
NRBC # BLD: 0 /100 WBCS — SIGNIFICANT CHANGE UP (ref 0–0)
PHOSPHATE SERPL-MCNC: 4.4 MG/DL — SIGNIFICANT CHANGE UP (ref 2.1–4.9)
PLATELET # BLD AUTO: 205 K/UL — SIGNIFICANT CHANGE UP (ref 130–400)
POTASSIUM SERPL-MCNC: 4 MMOL/L — SIGNIFICANT CHANGE UP (ref 3.5–5)
POTASSIUM SERPL-SCNC: 4 MMOL/L — SIGNIFICANT CHANGE UP (ref 3.5–5)
RBC # BLD: 4 M/UL — LOW (ref 4.2–5.4)
RBC # FLD: 13.2 % — SIGNIFICANT CHANGE UP (ref 11.5–14.5)
SODIUM SERPL-SCNC: 143 MMOL/L — SIGNIFICANT CHANGE UP (ref 135–146)
WBC # BLD: 5.1 K/UL — SIGNIFICANT CHANGE UP (ref 4.8–10.8)
WBC # FLD AUTO: 5.1 K/UL — SIGNIFICANT CHANGE UP (ref 4.8–10.8)

## 2022-08-29 PROCEDURE — 99233 SBSQ HOSP IP/OBS HIGH 50: CPT

## 2022-08-29 RX ADMIN — GABAPENTIN 100 MILLIGRAM(S): 400 CAPSULE ORAL at 22:07

## 2022-08-29 RX ADMIN — Medication 81 MILLIGRAM(S): at 11:19

## 2022-08-29 RX ADMIN — LIDOCAINE 1 PATCH: 4 CREAM TOPICAL at 00:00

## 2022-08-29 NOTE — PROGRESS NOTE ADULT - SUBJECTIVE AND OBJECTIVE BOX
YESENIA WICK  67y Female    CHIEF COMPLAINT:    Patient is a 67y old  Female who presents with a chief complaint of Palpitations (28 Aug 2022 11:54)      INTERVAL HPI/OVERNIGHT EVENTS:    Patient seen and examined.    ROS: All other systems are negative.    Vital Signs:    T(F): 97.6 (22 @ 04:47), Max: 98.7 (22 @ 11:53)  HR: 72 (22 @ 05:21) (72 - 77)  BP: 125/68 (22 @ 04:47) (125/68 - 166/88)  RR: 18 (22 @ 04:47) (18 - 18)  SpO2: 97% (22 @ 05:21) (96% - 97%)  I&O's Summary    28 Aug 2022 07:01  -  29 Aug 2022 07:00  --------------------------------------------------------  IN: 600 mL / OUT: 0 mL / NET: 600 mL      Daily     Daily Weight in k.1 (29 Aug 2022 04:47)  CAPILLARY BLOOD GLUCOSE          PHYSICAL EXAM:    GENERAL:  NAD  SKIN: No rashes or lesions  HENT: Atraumatic. Normocephalic. PERRL. Moist membranes.  NECK: Supple, No JVD. No lymphadenopathy.  PULMONARY: CTA B/L. No wheezing. No rales  CVS: Normal S1, S2. Rate and Rhythm are regular. No murmurs.  ABDOMEN/GI: Soft, Nontender, Nondistended; BS present  EXTREMITIES: Peripheral pulses intact. No edema B/L LE.  NEUROLOGIC:  No motor or sensory deficit.  PSYCH: Alert & oriented x 3    Consultant(s) Notes Reviewed:  [x ] YES  [ ] NO  Care Discussed with Consultants/Other Providers [ x] YES  [ ] NO    EKG reviewed  Telemetry reviewed    LABS:                        11.8   4.75  )-----------( 211      ( 28 Aug 2022 07:08 )             35.8     08-    142  |  106  |  18  ----------------------------<  97  4.6   |  26  |  0.7    Ca    8.8      28 Aug 2022 07:08  Phos  4.0       Mg     2.0               Trop <0.01, CKMB --, CK --, 22 @ 16:28  Trop <0.01, CKMB --, CK --, 22 @ 11:18        RADIOLOGY & ADDITIONAL TESTS:      Imaging or report Personally Reviewed:  [ ] YES  [ ] NO    Medications:  Standing  aspirin  Oral Chewable Tab - Peds 81 milliGRAM(s) Chew daily  enoxaparin Injectable 40 milliGRAM(s) SubCutaneous every 24 hours  gabapentin 100 milliGRAM(s) Oral at bedtime    PRN Meds  albuterol/ipratropium for Nebulization 3 milliLiter(s) Nebulizer every 12 hours PRN      Case discussed with resident    Care discussed with pt/family           YESENIA WICK  67y Female    CHIEF COMPLAINT:    Patient is a 67y old  Female who presents with a chief complaint of Palpitations (28 Aug 2022 11:54)      INTERVAL HPI/OVERNIGHT EVENTS:    Patient seen and examined. C/O intermittent palpitations. No cp. No sob. No dizziness.     ROS: All other systems are negative.    Vital Signs:    T(F): 97.6 (22 @ 04:47), Max: 98.7 (22 @ 11:53)  HR: 72 (22 @ 05:21) (72 - 77)  BP: 125/68 (22 @ 04:47) (125/68 - 166/88)  RR: 18 (22 @ 04:47) (18 - 18)  SpO2: 97% (22 @ 05:21) (96% - 97%)  I&O's Summary    28 Aug 2022 07:01  -  29 Aug 2022 07:00  --------------------------------------------------------  IN: 600 mL / OUT: 0 mL / NET: 600 mL      Daily     Daily Weight in k.1 (29 Aug 2022 04:47)  CAPILLARY BLOOD GLUCOSE          PHYSICAL EXAM:    GENERAL:  NAD  SKIN: No rashes or lesions  HENT: Atraumatic. Normocephalic. PERRL. Moist membranes.  NECK: Supple, No JVD. No lymphadenopathy.  PULMONARY: CTA B/L. No wheezing. No rales  CVS: Normal S1, S2. Rate and Rhythm are regular. No murmurs.  ABDOMEN/GI: Soft, Nontender, Nondistended; BS present  EXTREMITIES: Peripheral pulses intact. No edema B/L LE.  NEUROLOGIC:  No motor or sensory deficit.  PSYCH: Alert & oriented x 3    Consultant(s) Notes Reviewed:  [x ] YES  [ ] NO  Care Discussed with Consultants/Other Providers [ x] YES  [ ] NO    EKG reviewed  Telemetry reviewed    LABS:                        11.8   4.75  )-----------( 211      ( 28 Aug 2022 07:08 )             35.8         142  |  106  |  18  ----------------------------<  97  4.6   |  26  |  0.7    Ca    8.8      28 Aug 2022 07:08  Phos  4.0       Mg     2.0               Trop <0.01, CKMB --, CK --, 22 @ 16:28  Trop <0.01, CKMB --, CK --, 22 @ 11:18        RADIOLOGY & ADDITIONAL TESTS:    < from: TTE Echo Complete w/o Contrast w/ Doppler (22 @ 06:36) >    Summary:   1. Mildly decreased global left ventricular systolic function.   2. LV Ejection Fraction by Conti's Method with a biplane EF of 50 %.   3. Normal left ventricular internal cavity size.   4. Mildly reduced global (non-regional) systolic function. The mean   global longitudinal peak strain by speckle tracking is -17.1% which is   borderline normal.   5. Normal left atrial size.   6. Normal right atrial size.   7. Trivial pericardial effusion.   8. Trace mitral valve regurgitation.   9. LA volume Index is 32.7 ml/m² ml/m2.    < end of copied text >    Imaging or report Personally Reviewed:  [ ] YES  [ ] NO    Medications:  Standing  aspirin  Oral Chewable Tab - Peds 81 milliGRAM(s) Chew daily  enoxaparin Injectable 40 milliGRAM(s) SubCutaneous every 24 hours  gabapentin 100 milliGRAM(s) Oral at bedtime    PRN Meds  albuterol/ipratropium for Nebulization 3 milliLiter(s) Nebulizer every 12 hours PRN      Case discussed with resident    Care discussed with pt/family

## 2022-08-29 NOTE — PROGRESS NOTE ADULT - ASSESSMENT
66 yo F patient with a PMH of HTN and pulmonary sarcoidosis presents to the ED for palpitations. CT angio of the coronary arteries showed normal coronary arteries, ECG revealed sinus tachycardia.      Palpitations  H/O Pulmonary Sarcoidosis  HTN           PLAN: 66 yo F patient with a PMH of HTN and pulmonary sarcoidosis presents to the ED for palpitations. CT angio of the coronary arteries showed normal coronary arteries, ECG revealed sinus tachycardia.      Palpitations  H/O Pulmonary Sarcoidosis  HTN           PLAN:    ·	No events on tele  ·	CE x 2 are negative  ·	ECHO reviewed. EF is 50%. Trivial pericardial effusion.   ·	H/O pulmonary sarcoidosis. EP recommended Cardiac MRI to r/o cardiac involvement.     Progress Note Handoff    Pending (specify):  Consults_________, Tests_Cardiac MRI_______, Test Results_______, Other_________  Family discussion:  Disposition: Home___/SNF___/Other________/Unknown at this time________    Malcom Suazo MD  Spectra: [phone]

## 2022-08-29 NOTE — PROGRESS NOTE ADULT - SUBJECTIVE AND OBJECTIVE BOX
YESENIA WICK 67y Female  MRN#: [id number]     Hospital Day: 4d    Pt is currently admitted with the chief complaint of palpitations    SUBJECTIVE    Patient seen and examined at bedside. Resting comfortably in NAD. Complaining of unchanged neck and back pain. Patient reports SOB on exertion, unchanged from baseline. No chest pain, N/V/D. Pending cardiac MRI                                              ----------------------------------------------------------  OBJECTIVE  PAST MEDICAL & SURGICAL HISTORY  Sarcoidosis    Hypertension    High blood cholesterol    History of ankle surgery                                              -----------------------------------------------------------  ALLERGIES:  No Known Allergies                                            ------------------------------------------------------------    HOME MEDICATIONS  Home Medications:  amLODIPine:  (12 Aug 2022 19:20)  aspirin 81 mg oral tablet, chewable:  (12 Aug 2022 19:20)  celecoxib 200 mg oral capsule: 1 cap(s) orally once a day (26 Aug 2022 10:59)  Fish Oil 1200 mg oral capsule: 1 cap(s) orally 3 times a day (26 Aug 2022 10:56)  gabapentin 100 mg oral tablet: 1 tab(s) orally once a day (26 Aug 2022 10:58)  losartan 50 mg oral tablet: 1 tab(s) orally once a day (26 Aug 2022 10:57)  Vitamin D3 50 mcg (2000 intl units) oral tablet: 1 tab(s) orally once a day (26 Aug 2022 11:00)                           MEDICATIONS:  STANDING MEDICATIONS  aspirin  Oral Chewable Tab - Peds 81 milliGRAM(s) Chew daily  enoxaparin Injectable 40 milliGRAM(s) SubCutaneous every 24 hours  gabapentin 100 milliGRAM(s) Oral at bedtime    PRN MEDICATIONS  albuterol/ipratropium for Nebulization 3 milliLiter(s) Nebulizer every 12 hours PRN                                            ------------------------------------------------------------  VITAL SIGNS: Last 24 Hours  T(C): 36.4 (29 Aug 2022 04:47), Max: 37.1 (28 Aug 2022 11:53)  T(F): 97.6 (29 Aug 2022 04:47), Max: 98.7 (28 Aug 2022 11:53)  HR: 72 (29 Aug 2022 05:21) (72 - 77)  BP: 125/68 (29 Aug 2022 04:47) (125/68 - 166/88)  BP(mean): --  RR: 18 (29 Aug 2022 04:47) (18 - 18)  SpO2: 97% (29 Aug 2022 05:21) (96% - 97%)      08-28-22 @ 07:01  -  08-29-22 @ 07:00  --------------------------------------------------------  IN: 600 mL / OUT: 0 mL / NET: 600 mL                                             --------------------------------------------------------------  LABS:                        11.1   5.10  )-----------( 205      ( 29 Aug 2022 06:18 )             32.5     08-29    143  |  105  |  17  ----------------------------<  101<H>  4.0   |  27  |  0.7    Ca    8.6      29 Aug 2022 06:18  Phos  4.4     08-29  Mg     2.0     08-29                                                -------------------------------------------------------------  RADIOLOGY:                                            --------------------------------------------------------------    PHYSICAL EXAM:  CONSTITUTIONAL: Well-developed; well-nourished; in no acute distress. Obese   SKIN: Skin exam is warm and dry, no acute rash.  HEAD: Normocephalic; atraumatic.  CARD: Regular rate and rhythm. Normal S1, S2; no MRG  RESP: Lungs clear to auscultation bilaterally. No wheezes, rales or rhonchi.  ABD: Abdomen soft; non-tender; non-distended; no hepatosplenomegaly. No costovertebral angle tenderness.   EXT: Normal ROM. No clubbing, cyanosis or edema. No calf tenderness to palpation.  NEURO: Alert and oriented x 3. No focal deficits.                                             --------------------------------------------------------------    ASSESSMENT & PLAN    68 yo F patient with a PMH of HTN and pulmonary sarcoidosis presents to the ED for palpitations.    # Palpitations  - started on day of admission  - no electrolyte abnormalities  - EKG: sinus tachycardia in 110s  - TSH 4.23  - no tele events  - Cardio and EP consulted  - EP: MRI cardiac to rule out cardiac sarcoidosis  - Cardio recommending bb- will hold off for now as HR and BP have been stable, discussed w/ EP    # HTN  - BP well controlled off meds  - monitor     # back pain  - c/w Lidocaine patch     # Sarcoidosis  - Not on steroids  - out-patient follow-up  - f/u cardiac MRI    DVT ppx: Lovenox 40 mg  Diet: DASH  GI ppx: not indicated  Activity: ambulate as tolerated  Code: full  Pending: cardiac MRI

## 2022-08-30 LAB
ALBUMIN SERPL ELPH-MCNC: 3.9 G/DL — SIGNIFICANT CHANGE UP (ref 3.5–5.2)
ALP SERPL-CCNC: 68 U/L — SIGNIFICANT CHANGE UP (ref 30–115)
ALT FLD-CCNC: 15 U/L — SIGNIFICANT CHANGE UP (ref 0–41)
ANION GAP SERPL CALC-SCNC: 8 MMOL/L — SIGNIFICANT CHANGE UP (ref 7–14)
AST SERPL-CCNC: 12 U/L — SIGNIFICANT CHANGE UP (ref 0–41)
BASOPHILS # BLD AUTO: 0.02 K/UL — SIGNIFICANT CHANGE UP (ref 0–0.2)
BASOPHILS NFR BLD AUTO: 0.5 % — SIGNIFICANT CHANGE UP (ref 0–1)
BILIRUB SERPL-MCNC: 0.4 MG/DL — SIGNIFICANT CHANGE UP (ref 0.2–1.2)
BUN SERPL-MCNC: 15 MG/DL — SIGNIFICANT CHANGE UP (ref 10–20)
CALCIUM SERPL-MCNC: 8.9 MG/DL — SIGNIFICANT CHANGE UP (ref 8.5–10.1)
CHLORIDE SERPL-SCNC: 108 MMOL/L — SIGNIFICANT CHANGE UP (ref 98–110)
CO2 SERPL-SCNC: 28 MMOL/L — SIGNIFICANT CHANGE UP (ref 17–32)
CREAT SERPL-MCNC: 0.7 MG/DL — SIGNIFICANT CHANGE UP (ref 0.7–1.5)
EGFR: 94 ML/MIN/1.73M2 — SIGNIFICANT CHANGE UP
EOSINOPHIL # BLD AUTO: 0.17 K/UL — SIGNIFICANT CHANGE UP (ref 0–0.7)
EOSINOPHIL NFR BLD AUTO: 4 % — SIGNIFICANT CHANGE UP (ref 0–8)
GLUCOSE SERPL-MCNC: 103 MG/DL — HIGH (ref 70–99)
HCT VFR BLD CALC: 33.7 % — LOW (ref 37–47)
HGB BLD-MCNC: 11.5 G/DL — LOW (ref 12–16)
IMM GRANULOCYTES NFR BLD AUTO: 0.2 % — SIGNIFICANT CHANGE UP (ref 0.1–0.3)
LYMPHOCYTES # BLD AUTO: 1.28 K/UL — SIGNIFICANT CHANGE UP (ref 1.2–3.4)
LYMPHOCYTES # BLD AUTO: 30.5 % — SIGNIFICANT CHANGE UP (ref 20.5–51.1)
MAGNESIUM SERPL-MCNC: 2.1 MG/DL — SIGNIFICANT CHANGE UP (ref 1.8–2.4)
MCHC RBC-ENTMCNC: 27.8 PG — SIGNIFICANT CHANGE UP (ref 27–31)
MCHC RBC-ENTMCNC: 34.1 G/DL — SIGNIFICANT CHANGE UP (ref 32–37)
MCV RBC AUTO: 81.6 FL — SIGNIFICANT CHANGE UP (ref 81–99)
MONOCYTES # BLD AUTO: 0.41 K/UL — SIGNIFICANT CHANGE UP (ref 0.1–0.6)
MONOCYTES NFR BLD AUTO: 9.8 % — HIGH (ref 1.7–9.3)
NEUTROPHILS # BLD AUTO: 2.31 K/UL — SIGNIFICANT CHANGE UP (ref 1.4–6.5)
NEUTROPHILS NFR BLD AUTO: 55 % — SIGNIFICANT CHANGE UP (ref 42.2–75.2)
NRBC # BLD: 0 /100 WBCS — SIGNIFICANT CHANGE UP (ref 0–0)
PLATELET # BLD AUTO: 203 K/UL — SIGNIFICANT CHANGE UP (ref 130–400)
POTASSIUM SERPL-MCNC: 4.8 MMOL/L — SIGNIFICANT CHANGE UP (ref 3.5–5)
POTASSIUM SERPL-SCNC: 4.8 MMOL/L — SIGNIFICANT CHANGE UP (ref 3.5–5)
PROT SERPL-MCNC: 6.2 G/DL — SIGNIFICANT CHANGE UP (ref 6–8)
RBC # BLD: 4.13 M/UL — LOW (ref 4.2–5.4)
RBC # FLD: 13.2 % — SIGNIFICANT CHANGE UP (ref 11.5–14.5)
SODIUM SERPL-SCNC: 144 MMOL/L — SIGNIFICANT CHANGE UP (ref 135–146)
WBC # BLD: 4.2 K/UL — LOW (ref 4.8–10.8)
WBC # FLD AUTO: 4.2 K/UL — LOW (ref 4.8–10.8)

## 2022-08-30 PROCEDURE — 75561 CARDIAC MRI FOR MORPH W/DYE: CPT | Mod: 26

## 2022-08-30 PROCEDURE — 99232 SBSQ HOSP IP/OBS MODERATE 35: CPT

## 2022-08-30 RX ORDER — NYSTATIN CREAM 100000 [USP'U]/G
1 CREAM TOPICAL
Refills: 0 | Status: DISCONTINUED | OUTPATIENT
Start: 2022-08-30 | End: 2022-08-31

## 2022-08-30 RX ADMIN — GABAPENTIN 100 MILLIGRAM(S): 400 CAPSULE ORAL at 22:13

## 2022-08-30 RX ADMIN — ENOXAPARIN SODIUM 40 MILLIGRAM(S): 100 INJECTION SUBCUTANEOUS at 11:23

## 2022-08-30 RX ADMIN — NYSTATIN CREAM 1 APPLICATION(S): 100000 CREAM TOPICAL at 18:17

## 2022-08-30 RX ADMIN — Medication 81 MILLIGRAM(S): at 11:20

## 2022-08-30 NOTE — PROGRESS NOTE ADULT - SUBJECTIVE AND OBJECTIVE BOX
----------Daily Progress Note----------    HISTORY OF PRESENT ILLNESS:  Patient is a 67y old Female who presents with a chief complaint of Palpitations (29 Aug 2022 10:18)    Currently admitted to medicine with the primary diagnosis of SVT (supraventricular tachycardia)       Today is hospital day 5d.     INTERVAL HOSPITAL COURSE / OVERNIGHT EVENTS:    No overnight events, no complaints. Denies chest pain, SOB, nausea, vomiting.     Review of Systems: Otherwise unremarkable     <<<<<PAST MEDICAL & SURGICAL HISTORY>>>>>  Sarcoidosis    Hypertension    High blood cholesterol    History of ankle surgery      ALLERGIES  No Known Allergies      Home Medications:  amLODIPine:  (12 Aug 2022 19:20)  aspirin 81 mg oral tablet, chewable:  (12 Aug 2022 19:20)  celecoxib 200 mg oral capsule: 1 cap(s) orally once a day (26 Aug 2022 10:59)  Fish Oil 1200 mg oral capsule: 1 cap(s) orally 3 times a day (26 Aug 2022 10:56)  gabapentin 100 mg oral tablet: 1 tab(s) orally once a day (26 Aug 2022 10:58)  losartan 50 mg oral tablet: 1 tab(s) orally once a day (26 Aug 2022 10:57)  Vitamin D3 50 mcg (2000 intl units) oral tablet: 1 tab(s) orally once a day (26 Aug 2022 11:00)        MEDICATIONS  STANDING MEDICATIONS  aspirin  Oral Chewable Tab - Peds 81 milliGRAM(s) Chew daily  enoxaparin Injectable 40 milliGRAM(s) SubCutaneous every 24 hours  gabapentin 100 milliGRAM(s) Oral at bedtime  nystatin Powder 1 Application(s) Topical two times a day    PRN MEDICATIONS  albuterol/ipratropium for Nebulization 3 milliLiter(s) Nebulizer every 12 hours PRN    VITALS:  T(F): 97.3  HR: 61  BP: 113/63  RR: 18  SpO2: 98%    <<<<<LABS>>>>>                        11.5   4.20  )-----------( 203      ( 30 Aug 2022 07:09 )             33.7     08-30    144  |  108  |  15  ----------------------------<  103<H>  4.8   |  28  |  0.7    Ca    8.9      30 Aug 2022 07:09  Phos  4.4     08-29  Mg     2.1     08-30    TPro  6.2  /  Alb  3.9  /  TBili  0.4  /  DBili  x   /  AST  12  /  ALT  15  /  AlkPhos  68  08-30            [id number]        <<<<<RADIOLOGY>>>>>    < from: Xray Lumbosacral Spine (08.28.22 @ 12:22) >  PROCEDURE DATE:  08/28/2022          INTERPRETATION:  HISTORY & REASON FOR EXAM: Back pain.    TECHNIQUE: 3 radiographic views of the lumbar spine.    COMPARISON: None.      FINDINGS:    5 nonrib-bearing lumbar vertebrae. Pedicles are visualized. Mild loss of   height of T12 of indeterminate age. Remaining vertebral body heights are   preserved. Grade 1 retrolisthesis of L4 on L5. Multilevel degenerative   disc disease, severe at L5-S1. Severe L4-L5 facet arthropathy.      IMPRESSION:    1. Mild loss of height of T12 of indeterminate age.  2. Chronic/degenerative change as above.    < end of copied text >      <<<<<PHYSICAL EXAM>>>>>  GENERAL: NAD, resting comfortably in bed  PULMONARY: Clear to auscultation bilaterally. No rales, rhonchi, or wheezing.  CARDIOVASCULAR: Regular rate and rhythm, S1-S2, no murmurs  GASTROINTESTINAL: Soft, non-tender, non-distended, no guarding.  SKIN/EXTREMITIES: No LE edema b/l  NEUROLOGIC: AAOX3      -----------------------------------------------------------------------------------------------------------------------------------------------------------------------------------------------

## 2022-08-30 NOTE — PROGRESS NOTE ADULT - SUBJECTIVE AND OBJECTIVE BOX
YESENIA WICK  67y Female    CHIEF COMPLAINT:    Patient is a 67y old  Female who presents with a chief complaint of Palpitations (30 Aug 2022 09:10)      INTERVAL HPI/OVERNIGHT EVENTS:    Patient seen and examined. C/O intermittent palpitations. No dizziness. No sob. No cp    ROS: All other systems are negative.    Vital Signs:    T(F): 97.4 (22 @ 13:38), Max: 98.6 (22 @ 20:16)  HR: 75 (22 @ 13:38) (61 - 75)  BP: 136/74 (22 @ 13:38) (113/63 - 152/75)  RR: 18 (22 @ 13:38) (18 - 18)  SpO2: --  I&O's Summary    29 Aug 2022 07:01  -  30 Aug 2022 07:00  --------------------------------------------------------  IN: 200 mL / OUT: 0 mL / NET: 200 mL      Daily     Daily Weight in k.5 (30 Aug 2022 05:10)  CAPILLARY BLOOD GLUCOSE          PHYSICAL EXAM:    GENERAL:  NAD  SKIN: No rashes or lesions  HENT: Atraumatic. Normocephalic. PERRL. Moist membranes.  NECK: Supple, No JVD. No lymphadenopathy.  PULMONARY: CTA B/L. No wheezing. No rales  CVS: Normal S1, S2. Rate and Rhythm are regular. No murmurs.  ABDOMEN/GI: Soft, Nontender, Nondistended; BS present  EXTREMITIES: Peripheral pulses intact. No edema B/L LE.  NEUROLOGIC:  No motor or sensory deficit.  PSYCH: Alert & oriented x 3    Consultant(s) Notes Reviewed:  [x ] YES  [ ] NO  Care Discussed with Consultants/Other Providers [ x] YES  [ ] NO    EKG reviewed  Telemetry reviewed    LABS:                        11.5   4.20  )-----------( 203      ( 30 Aug 2022 07:09 )             33.7     08    144  |  108  |  15  ----------------------------<  103<H>  4.8   |  28  |  0.7    Ca    8.9      30 Aug 2022 07:09  Phos  4.4     08-  Mg     2.1         TPro  6.2  /  Alb  3.9  /  TBili  0.4  /  DBili  x   /  AST  12  /  ALT  15  /  AlkPhos  68                RADIOLOGY & ADDITIONAL TESTS:      Imaging or report Personally Reviewed:  [ ] YES  [ ] NO    Medications:  Standing  aspirin  Oral Chewable Tab - Peds 81 milliGRAM(s) Chew daily  enoxaparin Injectable 40 milliGRAM(s) SubCutaneous every 24 hours  gabapentin 100 milliGRAM(s) Oral at bedtime  nystatin Powder 1 Application(s) Topical two times a day    PRN Meds  albuterol/ipratropium for Nebulization 3 milliLiter(s) Nebulizer every 12 hours PRN      Case discussed with resident    Care discussed with pt/family

## 2022-08-30 NOTE — PROGRESS NOTE ADULT - ASSESSMENT
68 yo F patient with a PMH of HTN and pulmonary sarcoidosis presents to the ED for palpitations. CT angio of the coronary arteries showed normal coronary arteries, ECG revealed sinus tachycardia.      Palpitations  H/O Pulmonary Sarcoidosis  HTN           PLAN:    ·	No events on tele  ·	CE x 2 are negative  ·	ECHO reviewed. EF is 50%. Trivial pericardial effusion.   ·	H/O pulmonary sarcoidosis. EP recommended Cardiac MRI to r/o cardiac involvement.   ·	MRI is done. Read is pending. If normal will d/c her home on [facility].     Progress Note Handoff    Pending (specify):  Consults_________, Tests_______, Test Results_Cardiac MRI reading______, Other_________  Family discussion:  Disposition: Home___/SNF___/Other________/Unknown at this time________    Malcom Suazo MD  Spectra: [phone]

## 2022-08-30 NOTE — PROGRESS NOTE ADULT - ASSESSMENT
68 yo F patient with a PMH of HTN and pulmonary sarcoidosis presents to the ED for palpitations.    # Palpitations  - started on day of admission  - electrolytes wnl  - most recent EKG 8/27, NSR, HR well controlled  - TSH 4.23  - no overnight events on tele  - Cardio and EP following  - pending cardiac MRI to r/o infiltrative etiology  - Cardio recommending BB- will hold off for now as HR and BP have been stable, discussed w/ EP    # HTN  - BP well controlled off meds  - monitor     # Back pain  - c/w Lidocaine patch     # Sarcoidosis  - Not on steroids  - out-patient follow-up  - pending cardiac MRI to r/o infiltrative sarcoidosis    DVT ppx: Lovenox 40 mg  Diet: DASH  GI ppx: not indicated  Activity: ambulate as tolerated  Code: full  Pending: cardiac MRI

## 2022-08-31 ENCOUNTER — TRANSCRIPTION ENCOUNTER (OUTPATIENT)
Age: 67
End: 2022-08-31

## 2022-08-31 VITALS
RESPIRATION RATE: 18 BRPM | HEART RATE: 77 BPM | TEMPERATURE: 98 F | SYSTOLIC BLOOD PRESSURE: 150 MMHG | DIASTOLIC BLOOD PRESSURE: 71 MMHG

## 2022-08-31 LAB
ALBUMIN SERPL ELPH-MCNC: 4.1 G/DL — SIGNIFICANT CHANGE UP (ref 3.5–5.2)
ALP SERPL-CCNC: 70 U/L — SIGNIFICANT CHANGE UP (ref 30–115)
ALT FLD-CCNC: 14 U/L — SIGNIFICANT CHANGE UP (ref 0–41)
ANION GAP SERPL CALC-SCNC: 8 MMOL/L — SIGNIFICANT CHANGE UP (ref 7–14)
AST SERPL-CCNC: 13 U/L — SIGNIFICANT CHANGE UP (ref 0–41)
BILIRUB SERPL-MCNC: 0.4 MG/DL — SIGNIFICANT CHANGE UP (ref 0.2–1.2)
BUN SERPL-MCNC: 14 MG/DL — SIGNIFICANT CHANGE UP (ref 10–20)
CALCIUM SERPL-MCNC: 9.1 MG/DL — SIGNIFICANT CHANGE UP (ref 8.5–10.1)
CHLORIDE SERPL-SCNC: 105 MMOL/L — SIGNIFICANT CHANGE UP (ref 98–110)
CO2 SERPL-SCNC: 27 MMOL/L — SIGNIFICANT CHANGE UP (ref 17–32)
CREAT SERPL-MCNC: 0.7 MG/DL — SIGNIFICANT CHANGE UP (ref 0.7–1.5)
EGFR: 94 ML/MIN/1.73M2 — SIGNIFICANT CHANGE UP
GLUCOSE SERPL-MCNC: 95 MG/DL — SIGNIFICANT CHANGE UP (ref 70–99)
HCT VFR BLD CALC: 34.4 % — LOW (ref 37–47)
HGB BLD-MCNC: 11.6 G/DL — LOW (ref 12–16)
MAGNESIUM SERPL-MCNC: 2.1 MG/DL — SIGNIFICANT CHANGE UP (ref 1.8–2.4)
MCHC RBC-ENTMCNC: 27.4 PG — SIGNIFICANT CHANGE UP (ref 27–31)
MCHC RBC-ENTMCNC: 33.7 G/DL — SIGNIFICANT CHANGE UP (ref 32–37)
MCV RBC AUTO: 81.1 FL — SIGNIFICANT CHANGE UP (ref 81–99)
NRBC # BLD: 0 /100 WBCS — SIGNIFICANT CHANGE UP (ref 0–0)
PLATELET # BLD AUTO: 226 K/UL — SIGNIFICANT CHANGE UP (ref 130–400)
POTASSIUM SERPL-MCNC: 4.4 MMOL/L — SIGNIFICANT CHANGE UP (ref 3.5–5)
POTASSIUM SERPL-SCNC: 4.4 MMOL/L — SIGNIFICANT CHANGE UP (ref 3.5–5)
PROT SERPL-MCNC: 6.2 G/DL — SIGNIFICANT CHANGE UP (ref 6–8)
RBC # BLD: 4.24 M/UL — SIGNIFICANT CHANGE UP (ref 4.2–5.4)
RBC # FLD: 13.1 % — SIGNIFICANT CHANGE UP (ref 11.5–14.5)
SODIUM SERPL-SCNC: 140 MMOL/L — SIGNIFICANT CHANGE UP (ref 135–146)
WBC # BLD: 4.64 K/UL — LOW (ref 4.8–10.8)
WBC # FLD AUTO: 4.64 K/UL — LOW (ref 4.8–10.8)

## 2022-08-31 PROCEDURE — 99239 HOSP IP/OBS DSCHRG MGMT >30: CPT

## 2022-08-31 RX ORDER — METOPROLOL TARTRATE 50 MG
1 TABLET ORAL
Qty: 60 | Refills: 3
Start: 2022-08-31 | End: 2022-12-28

## 2022-08-31 RX ORDER — ASPIRIN/CALCIUM CARB/MAGNESIUM 324 MG
0 TABLET ORAL
Qty: 0 | Refills: 0 | DISCHARGE

## 2022-08-31 RX ORDER — LOSARTAN POTASSIUM 100 MG/1
1 TABLET, FILM COATED ORAL
Qty: 0 | Refills: 0 | DISCHARGE

## 2022-08-31 RX ORDER — AMLODIPINE BESYLATE 2.5 MG/1
0 TABLET ORAL
Qty: 0 | Refills: 0 | DISCHARGE

## 2022-08-31 RX ORDER — ACETAMINOPHEN 500 MG
325 TABLET ORAL ONCE
Refills: 0 | Status: COMPLETED | OUTPATIENT
Start: 2022-08-31 | End: 2022-08-31

## 2022-08-31 RX ADMIN — NYSTATIN CREAM 1 APPLICATION(S): 100000 CREAM TOPICAL at 05:20

## 2022-08-31 RX ADMIN — ENOXAPARIN SODIUM 40 MILLIGRAM(S): 100 INJECTION SUBCUTANEOUS at 13:08

## 2022-08-31 RX ADMIN — Medication 325 MILLIGRAM(S): at 13:28

## 2022-08-31 RX ADMIN — Medication 81 MILLIGRAM(S): at 13:07

## 2022-08-31 NOTE — PROGRESS NOTE ADULT - ASSESSMENT
68 yo F patient with a PMH of HTN and pulmonary sarcoidosis presents to the ED for palpitations. CT angio of the coronary arteries showed normal coronary arteries, ECG revealed sinus tachycardia.      Palpitations  H/O Pulmonary Sarcoidosis  HTN           PLAN:    ·	No events on tele except a few beats of NSVT  ·	Cardiac MRI is negative.   ·	CE x 2 are negative  ·	ECHO reviewed. EF is 50%. Trivial pericardial effusion.   ·	EP for  [facility].   ·	D/C home    * Med rec reviewed. Plan of care d/w the pt. Time spent 34 minutes.  66 yo F patient with a PMH of HTN and pulmonary sarcoidosis presents to the ED for palpitations. CT angio of the coronary arteries showed normal coronary arteries, ECG revealed sinus tachycardia.      Palpitations  H/O Pulmonary Sarcoidosis  HTN           PLAN:    ·	No events on tele except a few beats of NSVT  ·	Start Metoprolol 25 mg po q 12h  ·	Cardiac MRI is negative.   ·	CE x 2 are negative  ·	ECHO reviewed. EF is 50%. Trivial pericardial effusion.   ·	EP for  [facility].   ·	Pt's BP is stable without any meds. Will d/c Losartan and Amlodipine.   ·	D/C home    * Med rec reviewed. Plan of care d/w the pt. Time spent 34 minutes.

## 2022-08-31 NOTE — DISCHARGE NOTE PROVIDER - NSDCCPCAREPLAN_GEN_ALL_CORE_FT
PRINCIPAL DISCHARGE DIAGNOSIS  Diagnosis: SVT (supraventricular tachycardia)  Assessment and Plan of Treatment: Presented to te ED for chest palpitations. ECG findings showed supraventricular tachycardia, an Irregularly fast heart beat that causes chest discomfort. The cause is a problem with electrical signals and circuitry in the heart. When you heart is beating too fast, your heart can't fill with blood between beats, making it hard to get enough blood to your body. Triggers include having anxiety, drinking alcohol, smoke or using tobacco products, drinking more than recommended amount of caffeine. Please continue to take your medications as prescribed and follow up with your primary care doctor.

## 2022-08-31 NOTE — DISCHARGE NOTE NURSING/CASE MANAGEMENT/SOCIAL WORK - PATIENT PORTAL LINK FT
You can access the FollowMyHealth Patient Portal offered by [facility] by registering at the following website: [email or web address]. By joining [facility]’s FollowMyHealth portal, you will also be able to view your health information using other applications (apps) compatible with our system.

## 2022-08-31 NOTE — DISCHARGE NOTE PROVIDER - NSDCFUSCHEDAPPT_GEN_ALL_CORE_FT
Angel García  [facility]  ONCPAINT [id number]  Scheduled Appointment: 09/12/2022    Mango Hanley  [facility]  PULMMED [phone]  Scheduled Appointment: 10/05/2022

## 2022-08-31 NOTE — DISCHARGE NOTE NURSING/CASE MANAGEMENT/SOCIAL WORK - NSDCPEFALRISK_GEN_ALL_CORE
For information on Fall & Injury Prevention, visit: [email or web address] OR  [email or web address] OR  https://www.cdc.gov/steadi/patient.html

## 2022-08-31 NOTE — PROGRESS NOTE ADULT - SUBJECTIVE AND OBJECTIVE BOX
YESENIA WICK  67y Female    CHIEF COMPLAINT:    Patient is a 67y old  Female who presents with a chief complaint of Palpitations (Supraventricular Tachycardia  (31 Aug 2022 09:35)      INTERVAL HPI/OVERNIGHT EVENTS:    Patient seen and examined. C/O on and off palpitations. Feels better today. No sob.     ROS: All other systems are negative.    Vital Signs:    T(F): 97.8 (22 @ 05:36), Max: 97.9 (22 @ 20:14)  HR: 67 (22 @ 05:36) (65 - 75)  BP: 125/73 (22 @ 05:36) (125/73 - 136/74)  RR: 18 (22 @ 05:36) (18 - 18)  SpO2: --  I&O's Summary    Daily     Daily Weight in k.6 (31 Aug 2022 05:36)  CAPILLARY BLOOD GLUCOSE          PHYSICAL EXAM:    GENERAL:  NAD  SKIN: No rashes or lesions  HENT: Atraumatic. Normocephalic. PERRL. Moist membranes.  NECK: Supple, No JVD. No lymphadenopathy.  PULMONARY: CTA B/L. No wheezing. No rales  CVS: Normal S1, S2. Rate and Rhythm are regular. No murmurs.  ABDOMEN/GI: Soft, Nontender, Nondistended; BS present  EXTREMITIES: Peripheral pulses intact. No edema B/L LE.  NEUROLOGIC:  No motor or sensory deficit.  PSYCH: Alert & oriented x 3    Consultant(s) Notes Reviewed:  [x ] YES  [ ] NO  Care Discussed with Consultants/Other Providers [ x] YES  [ ] NO    EKG reviewed  Telemetry reviewed    LABS:                        11.6   4.64  )-----------( 226      ( 31 Aug 2022 06:27 )             34.4         140  |  105  |  14  ----------------------------<  95  4.4   |  27  |  0.7    Ca    9.1      31 Aug 2022 06:27  Mg     2.1         TPro  6.2  /  Alb  4.1  /  TBili  0.4  /  DBili  x   /  AST  13  /  ALT  14  /  AlkPhos  70                RADIOLOGY & ADDITIONAL TESTS:    < from: MR Cardiac w/wo IV Cont (22 @ 15:35) >    IMPRESSION:  Normal biventricular function and volume.    No evidence of late gadolinium enhancement    < end of copied text >    Imaging or report Personally Reviewed:  [ ] YES  [ ] NO    Medications:  Standing  aspirin  Oral Chewable Tab - Peds 81 milliGRAM(s) Chew daily  enoxaparin Injectable 40 milliGRAM(s) SubCutaneous every 24 hours  gabapentin 100 milliGRAM(s) Oral at bedtime  nystatin Powder 1 Application(s) Topical two times a day    PRN Meds  albuterol/ipratropium for Nebulization 3 milliLiter(s) Nebulizer every 12 hours PRN      Case discussed with resident    Care discussed with pt/family

## 2022-08-31 NOTE — DISCHARGE NOTE PROVIDER - HOSPITAL COURSE
66 yo F patient with a PMH of HTN and pulmonary sarcoidosis presents to the ED for palpitations.    Patient states that she started having these palpitations earlier today, appeared suddenly, without any precipitating/ relieving factors, with no chest pain, shortness of breath, lightheadedness, or syncope.    Patient states also that she felt tingling sensation in her fingers, as well as a globus sensation; questionable depersonalization.  Patient says that she gets sometimes heat intolerance, without any other hyperthyroid symptoms.    In the ED, the patient was tachycardic in the 110s, BP= 160/98.  Labs were not significant, patient received in the ED IV Mg.  CT angio of the coronary arteries showed normal coronary arteries, ECG revealed sinus tachycardia.    Patient was admitted to telemetry:     Imaging:   -MRI Cardia (for arrythmia to rule our sarciodosis 8/30) - normal  -12-lead ECG: low voltage QRS   -TTE Echo: global LV systolic dysfunction mildly decreased; trivial pericardial effusion; mild MV regurgitation     PE: 8/29 started complaining of neck/back pain, was given lidocaine patch to relieve symptoms    #Palpitations:   -started on day of admission   -no electrolyte abnormalities   -no tele events   -Per EP consult - Recommend Cardiac MRI (dtr wants pt to say in the hospital to get MRI done) to evaluate for cardiac sarcoidosis; Metoprolol if BP/HR allow (Hr currently 62); Pt needs an outpt sleep study; Will place MCOT on pt on day of discharge.    -Cardio recommending BB (toprol 25) - will hold off for now as HR and BP have been stable, discussed w/ FP     #HTN  -BP well controlled of medications     #Back Pain:   -c/w lidocaine patch     #Sarcoidosis:  -not on steroids  -outpatient f/u

## 2022-08-31 NOTE — CHART NOTE - NSCHARTNOTEFT_GEN_A_CORE
Electrophysiology PA Note    Event monitor [employer] placed on the patient for 30 days  Bedside teaching provided to patient and family  Follow up with Dr Guillen in 6 weeks  [address] [place] [address], [address]  [phone]       Instructions for use:  - Patch placed on left chest wall  - Take patch off every 7 days and remove the white sensor. Throw the patch away and place the sensor on the  until fully charged.  - After sensor charged, attach to a new patch and place on left side of chest. Repeat this EVERY 7 DAYS  - Event monitor comes with phone monitor to assess for any patient symptoms. It is touch screen and needs to be charged EVERY night. If patient has any symptoms follow the prompts on the phone which will send to the office. If no symptoms, phone does not need to be used.  - Patient is to wear MCOT for 30 days,   - When patch is removed, place everything back in the box and use included the pre-paid UPS envelop to send back to the company.  - All chargers and patches can be found in the second small box inside the big box  - Call company, [employer], for more supplies  - For any questions regarding use can call the office [phone]

## 2022-08-31 NOTE — PROGRESS NOTE ADULT - PROVIDER SPECIALTY LIST ADULT
Hospitalist
Internal Medicine
Internal Medicine
Hospitalist

## 2022-08-31 NOTE — DISCHARGE NOTE PROVIDER - NSDCMRMEDTOKEN_GEN_ALL_CORE_FT
amLODIPine:   aspirin 81 mg oral tablet, chewable:   celecoxib 200 mg oral capsule: 1 cap(s) orally once a day  Fish Oil 1200 mg oral capsule: 1 cap(s) orally 3 times a day  gabapentin 100 mg oral tablet: 1 tab(s) orally once a day  losartan 50 mg oral tablet: 1 tab(s) orally once a day  Vitamin D3 50 mcg (2000 intl units) oral tablet: 1 tab(s) orally once a day   aspirin 81 mg oral tablet, chewable: orally once a day  celecoxib 200 mg oral capsule: 1 cap(s) orally once a day  Fish Oil 1200 mg oral capsule: 1 cap(s) orally 3 times a day  gabapentin 100 mg oral tablet: 1 tab(s) orally once a day  metoprolol tartrate 25 mg oral tablet: 1 tab(s) orally 2 times a day   Vitamin D3 50 mcg (2000 intl units) oral tablet: 1 tab(s) orally once a day

## 2022-09-08 DIAGNOSIS — I10 ESSENTIAL (PRIMARY) HYPERTENSION: ICD-10-CM

## 2022-09-08 DIAGNOSIS — Z79.82 LONG TERM (CURRENT) USE OF ASPIRIN: ICD-10-CM

## 2022-09-08 DIAGNOSIS — D86.0 SARCOIDOSIS OF LUNG: ICD-10-CM

## 2022-09-08 DIAGNOSIS — I47.1 SUPRAVENTRICULAR TACHYCARDIA: ICD-10-CM

## 2022-09-08 DIAGNOSIS — I31.3 PERICARDIAL EFFUSION (NONINFLAMMATORY): ICD-10-CM

## 2022-09-08 DIAGNOSIS — M54.9 DORSALGIA, UNSPECIFIED: ICD-10-CM

## 2022-09-12 ENCOUNTER — APPOINTMENT (OUTPATIENT)
Dept: PAIN MANAGEMENT | Facility: CLINIC | Age: 67
End: 2022-09-12

## 2022-09-12 VITALS
HEART RATE: 60 BPM | HEIGHT: 64 IN | WEIGHT: 175 LBS | BODY MASS INDEX: 29.88 KG/M2 | DIASTOLIC BLOOD PRESSURE: 94 MMHG | SYSTOLIC BLOOD PRESSURE: 170 MMHG

## 2022-09-12 DIAGNOSIS — Z87.09 PERSONAL HISTORY OF OTHER DISEASES OF THE RESPIRATORY SYSTEM: ICD-10-CM

## 2022-09-12 DIAGNOSIS — M79.18 MYALGIA, OTHER SITE: ICD-10-CM

## 2022-09-12 PROCEDURE — 99203 OFFICE O/P NEW LOW 30 MIN: CPT

## 2022-09-12 NOTE — HISTORY OF PRESENT ILLNESS
[FreeTextEntry1] : HISTORY OF PRESENT ILLNESS: This is a 67 year old woman complaining of neck and mid back pain. The patient has had this pain for a few months. The pain started after no specific injury or event. Patient describes pain as moderate to severe, rating 8/10 on the pain scale at worst. During the last month the pain has been nearly constant with symptoms worse in the afternoon. Pain described as sharp. Pain is associated with numbness and pins and needles into the upper extremities. Patient has weakness in the upper extremities. Pain is increased with lying down, sitting and coughing/sneezing. Bowel or bladder habits have not changed.\par  \par ACTIVITIES: Patient could walk less than a block before the pain starts. Patient can sit 1 hour  before pain starts. Patient can stand 30 minutes before pain starts. The patient often lays down because of pain. Patient uses no assistive walking device at this time. Patient has difficulty household chores at this time.\par \par PRIOR PAIN TREATMENTS:  No prior pain treatment\par \par PRIOR PAIN MEDICATIONS:  Tylenol\par \par Covid 19 - This in-office encounter has occurred during a Public Health Emergency (PHE). As required by law, due to the risk of respiratory-transmitted infectious diseases, our office provided additional materials, supplies and clinical staff to assist in keeping our patients, physicians and office staff safe during this health emergency.\par

## 2022-09-12 NOTE — PHYSICAL EXAM
[de-identified] : GENERAL APPEARANCE OF PATIENT IS WELL DEVELOPED, WELL NOURISHED, BODY HABITUS NORMAL, WELL GROOMED, NO DEFORMITIES NOTED. \par Head - Atraumatic and Normocephalic \par Eyes, Nose, and Throat: External inspection of ears and nose are normal overall without scars, lesions, or masses noted. Assessment of hearing is normal\par Neck-Examination of neck shows no masses, overall appearance is normal, neck is symmetric, tracheal position is midline, no crepitus is noted. Examination of thyroid shows no enlargement, tenderness or masses\par Respiratory- Assessment of respiratory effort shows no intercostal retractions, no use of accessory muscles, unlabored breathing, and normal diaphragmatic movement.\par Cardiovascular- Examination of extremities show no edema or varicosities\par Musculoskeletal. Examination of gait is not antalgic and station is normal\par Inspection and palpation of digits and nails shows no clubbing, cyanosis, nodules, drainage, fluctuance, petechiae\par \par • Spine – inspection and palpation shows no misalignment, asymmetry, crepitation, defects, tenderness, masses, effusions. ROM is normal without crepitation or contracture. No instability or subluxation or laxity is noted. No abnormal movements.\par \par \par • Neck- pain at 15 degrees of flexion. Pain at 15 degrees of extension. \par \par \par • RUE- inspection and palpation shows no misalignment, asymmetry, crepitation, defects, tenderness, masses, effusions. ROM is normal without crepitation or contracture. No instability or subluxation or laxity is noted. No abnormal movements.\par \par \par • LUE- inspection and palpation shows no misalignment, asymmetry, crepitation, defects, tenderness, masses, effusions. ROM is normal without crepitation or contracture. No instability or subluxation or laxity is noted. No abnormal movements.\par \par \par • RLE- inspection and palpation shows no misalignment, asymmetry, crepitation, defects, tenderness, masses, effusions. ROM is normal without crepitation or contracture. No instability or subluxation or laxity is noted. No abnormal movements.\par \par \par • LLE- inspection and palpation shows no misalignment, asymmetry, crepitation, defects, tenderness, masses, effusions. ROM is normal without crepitation or contracture. No instability or subluxation or laxity is noted. No abnormal movements.\par \par \par • Skin- Inspection of skin and subcutaneous tissue shows no rashes, lesions or ulcers. Palpation of skin and subcutaneous tissue shows no rashes, no indurations, subcutaneous nodules or tightening.\par \par \par • Abdomen- Nontender\par \par \par • Neurologic- CN 2-12 are grossly intact. No sensory or motor deficits in the upper and lower extremities. Adequate strength in upper and lower extremities \par \par \par • Psychiatric- Patient’s judgment and insight are intact. Oriented to time, place and person. Recent and remote memory intact\par \par

## 2022-09-12 NOTE — DISCUSSION/SUMMARY
[de-identified] : Ms. Trinh Damon Is a 67-year-old female with a chief complaint of neck and mid back pain.  She has had this pain for a few months following no precipitating injury or event.  Patient has undergone no prior pain treatment.    A cervical MRI was ordered to delineate spine pathology such as disc displacement and stenosis.\par \par In the interim , patient will trial physical therapy of the neck 2-3 times a week for 4-6 weeks stressing a home exercise program of walking, shoulder griddle strengthening,  swimming, elliptical , recumbent bike, Will chi and Yoga. Use things that heat like hot shower or icy heat before rehab, exercising and at the beginning of the day, and ice (ice in a bag never directly on the skin) after activity and at the end of the day.\par \par Patient will follow up in 6 weeks to review imaging results and discuss further treatment options.  All of this patient's questions were answered and she understood our conversation well. \par \par I, Annamarie Marcos, attest that this documentation has been prepared under the direction and in the presence of Provider Angel García DO\carolann The documentation recorded by the scribe, in my presence, accurately reflects the service I personally performed, and the decisions made by me with my edits as appropriate.\par \par Best Regards, \par Angel García D.O. \par Diplomat, American Board of Anesthesiology \par Diplomat, American Board of Pain Medicine \par Diplomat, American Board of Pain Management

## 2022-10-05 ENCOUNTER — APPOINTMENT (OUTPATIENT)
Age: 67
End: 2022-10-05

## 2022-10-05 VITALS
DIASTOLIC BLOOD PRESSURE: 80 MMHG | SYSTOLIC BLOOD PRESSURE: 130 MMHG | OXYGEN SATURATION: 95 % | HEART RATE: 71 BPM | HEIGHT: 64 IN | BODY MASS INDEX: 30.05 KG/M2 | WEIGHT: 176 LBS | RESPIRATION RATE: 12 BRPM

## 2022-10-05 PROCEDURE — 99203 OFFICE O/P NEW LOW 30 MIN: CPT

## 2022-10-05 NOTE — REASON FOR VISIT
[Initial] : an initial visit [Sleep Evaluation] : sleep evaluation [TextBox_44] : The patient presents for the evaluation of possible ANGELICA.

## 2022-10-05 NOTE — ASSESSMENT
[FreeTextEntry1] : Assessment: \par There is a high clinical suspicion for ANGELICA. \par  The patient has classic signs of obstructive sleep apnea.\par Obesity\par \par Plan:\par I will order attended sleep testing and I will see the patient  in F/U to arrange for therapies as needed.\par Weight loss was discussed with the patient.\par

## 2022-10-05 NOTE — HISTORY OF PRESENT ILLNESS
[TextBox_4] : The patient presents for the evaluation of loud snoring and restlessness at night.\par The patient is restless during sleep and has frequent nocturia.\par The bed partner confirms the restlessness.\par During the day there are episodes of sleepiness.\par

## 2022-10-12 ENCOUNTER — APPOINTMENT (OUTPATIENT)
Dept: CARDIOLOGY | Facility: CLINIC | Age: 67
End: 2022-10-12

## 2022-10-12 VITALS
BODY MASS INDEX: 30.83 KG/M2 | SYSTOLIC BLOOD PRESSURE: 126 MMHG | HEART RATE: 61 BPM | DIASTOLIC BLOOD PRESSURE: 71 MMHG | WEIGHT: 174 LBS | HEIGHT: 63 IN | TEMPERATURE: 97.3 F

## 2022-10-12 PROCEDURE — 93000 ELECTROCARDIOGRAM COMPLETE: CPT | Mod: 59

## 2022-10-12 PROCEDURE — 99214 OFFICE O/P EST MOD 30 MIN: CPT

## 2022-10-12 NOTE — HISTORY OF PRESENT ILLNESS
[FreeTextEntry1] : Patient with history of hypertension and sarcoidosis came to the hospital last month complaining of palpitations. Patient had palpitations for over 2.5 hours that terminated spontaneously. In the hospital, the EKG showed normal sinus rhythm. While hospitalized, the patient had a cardiac MRI that was negative for LGE, had a CTA that showed normal coronary arteries and she has preserved LV function. \par Patient wore a Holter monitor that showed no SVT. Currently the patient has had no further episodes since the hospitalization. \par \par EKG normal sinus rhythm 61 BPM\par \par Echo 8/26/22: EF 50%, normal left atrial size, no major valvar disease\par CTA 8/24/22: showed normal coronary arteries\par MRI 8/30/22 showed normal biventricular function and volume, no evidence of LGE\par Event monitor 10/2022 showed normal rhythm, some PVCs and APCs. No AFib, no SVT.

## 2022-10-12 NOTE — ADDENDUM
[FreeTextEntry1] : IGeoffrey assisted in documentation on 10/12/2022 acting as a scribe for Dr. Kumar Thomas.

## 2022-10-25 ENCOUNTER — APPOINTMENT (OUTPATIENT)
Dept: PAIN MANAGEMENT | Facility: CLINIC | Age: 67
End: 2022-10-25

## 2022-11-08 ENCOUNTER — OUTPATIENT (OUTPATIENT)
Dept: OUTPATIENT SERVICES | Facility: HOSPITAL | Age: 67
LOS: 1 days | Discharge: HOME | End: 2022-11-08

## 2022-11-08 DIAGNOSIS — I47.1 SUPRAVENTRICULAR TACHYCARDIA: ICD-10-CM

## 2022-11-08 DIAGNOSIS — Z98.890 OTHER SPECIFIED POSTPROCEDURAL STATES: Chronic | ICD-10-CM

## 2022-11-08 PROCEDURE — 33285 INSJ SUBQ CAR RHYTHM MNTR: CPT

## 2022-11-08 RX ORDER — CEPHALEXIN 500 MG
500 CAPSULE ORAL ONCE
Refills: 0 | Status: COMPLETED | OUTPATIENT
Start: 2022-11-08 | End: 2022-11-08

## 2022-11-08 RX ADMIN — Medication 500 MILLIGRAM(S): at 10:18

## 2022-11-08 NOTE — H&P ADULT - HISTORY OF PRESENT ILLNESS
68 yo female with history of SVT who reports palpitations presents for elective loop recorder implant

## 2022-11-14 DIAGNOSIS — E78.00 PURE HYPERCHOLESTEROLEMIA, UNSPECIFIED: ICD-10-CM

## 2022-11-14 DIAGNOSIS — I10 ESSENTIAL (PRIMARY) HYPERTENSION: ICD-10-CM

## 2022-11-14 DIAGNOSIS — D86.9 SARCOIDOSIS, UNSPECIFIED: ICD-10-CM

## 2022-11-14 DIAGNOSIS — Z79.899 OTHER LONG TERM (CURRENT) DRUG THERAPY: ICD-10-CM

## 2022-11-14 DIAGNOSIS — R00.2 PALPITATIONS: ICD-10-CM

## 2022-11-26 ENCOUNTER — APPOINTMENT (OUTPATIENT)
Dept: SLEEP CENTER | Facility: HOSPITAL | Age: 67
End: 2022-11-26

## 2022-11-26 ENCOUNTER — OUTPATIENT (OUTPATIENT)
Dept: OUTPATIENT SERVICES | Facility: HOSPITAL | Age: 67
LOS: 1 days | Discharge: HOME | End: 2022-11-26

## 2022-11-26 DIAGNOSIS — Z98.890 OTHER SPECIFIED POSTPROCEDURAL STATES: Chronic | ICD-10-CM

## 2022-11-26 PROCEDURE — 95810 POLYSOM 6/> YRS 4/> PARAM: CPT | Mod: 26

## 2022-11-28 ENCOUNTER — APPOINTMENT (OUTPATIENT)
Dept: PAIN MANAGEMENT | Facility: CLINIC | Age: 67
End: 2022-11-28
Payer: MEDICARE

## 2022-11-28 VITALS
HEART RATE: 72 BPM | HEIGHT: 63 IN | WEIGHT: 174 LBS | BODY MASS INDEX: 30.83 KG/M2 | DIASTOLIC BLOOD PRESSURE: 79 MMHG | SYSTOLIC BLOOD PRESSURE: 128 MMHG

## 2022-11-28 DIAGNOSIS — G47.33 OBSTRUCTIVE SLEEP APNEA (ADULT) (PEDIATRIC): ICD-10-CM

## 2022-11-28 PROCEDURE — 99212 OFFICE O/P EST SF 10 MIN: CPT

## 2022-11-28 PROCEDURE — 99213 OFFICE O/P EST LOW 20 MIN: CPT

## 2022-11-28 NOTE — PHYSICAL EXAM
[de-identified] : GENERAL APPEARANCE OF PATIENT IS WELL DEVELOPED, WELL NOURISHED, BODY HABITUS NORMAL, WELL GROOMED, NO DEFORMITIES NOTED. \par Head - Atraumatic and Normocephalic \par Eyes, Nose, and Throat: External inspection of ears and nose are normal overall without scars, lesions, or masses noted. Assessment of hearing is normal\par Neck-Examination of neck shows no masses, overall appearance is normal, neck is symmetric, tracheal position is midline, no crepitus is noted. Examination of thyroid shows no enlargement, tenderness or masses\par Respiratory- Assessment of respiratory effort shows no intercostal retractions, no use of accessory muscles, unlabored breathing, and normal diaphragmatic movement.\par Cardiovascular- Examination of extremities show no edema or varicosities\par Musculoskeletal. Examination of gait is not antalgic and station is normal\par Inspection and palpation of digits and nails shows no clubbing, cyanosis, nodules, drainage, fluctuance, petechiae\par \par • Spine – inspection and palpation shows no misalignment, asymmetry, crepitation, defects, tenderness, masses, effusions. ROM is normal without crepitation or contracture. No instability or subluxation or laxity is noted. No abnormal movements.\par \par \par • Neck- pain at 15 degrees of flexion. Pain at 15 degrees of extension. \par \par \par • RUE- inspection and palpation shows no misalignment, asymmetry, crepitation, defects, tenderness, masses, effusions. ROM is normal without crepitation or contracture. No instability or subluxation or laxity is noted. No abnormal movements.\par \par \par • LUE- inspection and palpation shows no misalignment, asymmetry, crepitation, defects, tenderness, masses, effusions. ROM is normal without crepitation or contracture. No instability or subluxation or laxity is noted. No abnormal movements.\par \par \par • RLE- inspection and palpation shows no misalignment, asymmetry, crepitation, defects, tenderness, masses, effusions. ROM is normal without crepitation or contracture. No instability or subluxation or laxity is noted. No abnormal movements.\par \par \par • LLE- inspection and palpation shows no misalignment, asymmetry, crepitation, defects, tenderness, masses, effusions. ROM is normal without crepitation or contracture. No instability or subluxation or laxity is noted. No abnormal movements.\par \par \par • Skin- Inspection of skin and subcutaneous tissue shows no rashes, lesions or ulcers. Palpation of skin and subcutaneous tissue shows no rashes, no indurations, subcutaneous nodules or tightening.\par \par \par • Abdomen- Nontender\par \par \par • Neurologic- CN 2-12 are grossly intact. No sensory or motor deficits in the upper and lower extremities. Adequate strength in upper and lower extremities \par \par \par • Psychiatric- Patient’s judgment and insight are intact. Oriented to time, place and person. Recent and remote memory intact\par \par

## 2022-11-28 NOTE — HISTORY OF PRESENT ILLNESS
[FreeTextEntry1] : HISTORY OF PRESENT ILLNESS: This is a 67 year old woman complaining of neck and mid back pain. The patient has had this pain for a few months. The pain started after no specific injury or event. Patient describes pain as moderate to severe, rating 8/10 on the pain scale at worst. During the last month the pain has been nearly constant with symptoms worse in the afternoon. Pain described as sharp. Pain is associated with numbness and pins and needles into the upper extremities. Patient has weakness in the upper extremities. Pain is increased with lying down, sitting and coughing/sneezing. Bowel or bladder habits have not changed.\par  \par ACTIVITIES: Patient could walk less than a block before the pain starts. Patient can sit 1 hour  before pain starts. Patient can stand 30 minutes before pain starts. The patient often lays down because of pain. Patient uses no assistive walking device at this time. Patient has difficulty household chores at this time.\par \par PRIOR PAIN TREATMENTS:  No prior pain treatment\par \par PRIOR PAIN MEDICATIONS:  Tylenol\par \par PRESENTING TODAY: In revisit encounter in regard to her continued neck and mid back pain. She notes pain remains unchanged in severity and distribution since her last encounter. She has trialed 10-12 sessions of physical therapy with no sustained relief. She continues to have pins and needles in her hands. \par \par Covid 19 - This in-office encounter has occurred during a Public Health Emergency (PHE). As required by law, due to the risk of respiratory-transmitted infectious diseases, our office provided additional materials, supplies and clinical staff to assist in keeping our patients, physicians and office staff safe during this health emergency.\par

## 2022-11-28 NOTE — DISCUSSION/SUMMARY
[de-identified] : Ms. Trinh Damon Is a 67-year-old female with a chief complaint of neck and mid back pain.  She has had this pain for a few months following no precipitating injury or event.  Patient has undergone no prior pain treatment. She has trialed 10-12 sessions of physical therapy with no relief. She is unable to undergo physical therapy due to heart issues. \par \par She is a candidate for a CHIRAG, however she would like for me to speak with her daughter before she decides to proceed. \par \par Patient had a MRI that shows a radicular component along with pain referred into the upper extremity. Patient has trialed rehab (Home  exercise, physical therapy or chiropractic care) and medications.  I will schedule a cervical epidural steroid injection 1-3 depending on effectiveness Risk, benefits, pros and cons of procedure were explained to the patient using models and diagrams and their questions were answered.  \par \par The patient has severe anxiety of procedures that necessitates monitored anesthesia care (MAC). The procedure performed will be close to major nerves, arteries, and spinal cord and/or joint structures. Due to the proximity of these structures, we need the patient to be still during the procedure.  With the help of MAC, this will be safely achieved and decrease the risk of any complications.\par \par Patient will follow up in 6 weeks to review imaging results and discuss further treatment options.  All of this patient's questions were answered and she understood our conversation well. \par \par I, Annamarie Marcos, attest that this documentation has been prepared under the direction and in the presence of Provider Angel García DO\carolann The documentation recorded by the scribe, in my presence, accurately reflects the service I personally performed, and the decisions made by me with my edits as appropriate.\par \par Best Regards, \par Angel García, D.O. \par Diplomat, American Board of Anesthesiology \par Diplomat, American Board of Pain Medicine \par Diplomat, American Board of Pain Management

## 2022-12-05 ENCOUNTER — APPOINTMENT (OUTPATIENT)
Dept: CARDIOLOGY | Facility: CLINIC | Age: 67
End: 2022-12-05

## 2022-12-06 ENCOUNTER — APPOINTMENT (OUTPATIENT)
Dept: PAIN MANAGEMENT | Facility: CLINIC | Age: 67
End: 2022-12-06

## 2022-12-09 ENCOUNTER — APPOINTMENT (OUTPATIENT)
Dept: CARDIOLOGY | Facility: CLINIC | Age: 67
End: 2022-12-09

## 2022-12-09 VITALS
DIASTOLIC BLOOD PRESSURE: 66 MMHG | TEMPERATURE: 97.3 F | HEIGHT: 63 IN | HEART RATE: 88 BPM | BODY MASS INDEX: 30.83 KG/M2 | SYSTOLIC BLOOD PRESSURE: 110 MMHG | WEIGHT: 174 LBS

## 2022-12-09 DIAGNOSIS — Z95.818 PRESENCE OF OTHER CARDIAC IMPLANTS AND GRAFTS: ICD-10-CM

## 2022-12-09 DIAGNOSIS — Z48.89 ENCOUNTER FOR OTHER SPECIFIED SURGICAL AFTERCARE: ICD-10-CM

## 2022-12-09 PROCEDURE — 93000 ELECTROCARDIOGRAM COMPLETE: CPT

## 2022-12-09 PROCEDURE — 99214 OFFICE O/P EST MOD 30 MIN: CPT

## 2022-12-09 RX ORDER — GABAPENTIN 100 MG/1
CAPSULE ORAL
Refills: 0 | Status: COMPLETED | COMMUNITY
End: 2022-12-09

## 2022-12-09 RX ORDER — METOPROLOL TARTRATE 75 MG/1
TABLET, FILM COATED ORAL
Refills: 0 | Status: COMPLETED | COMMUNITY
End: 2022-12-09

## 2022-12-13 ENCOUNTER — NON-APPOINTMENT (OUTPATIENT)
Age: 67
End: 2022-12-13

## 2022-12-21 ENCOUNTER — APPOINTMENT (OUTPATIENT)
Dept: PULMONOLOGY | Facility: CLINIC | Age: 67
End: 2022-12-21

## 2022-12-21 VITALS
DIASTOLIC BLOOD PRESSURE: 72 MMHG | BODY MASS INDEX: 31.01 KG/M2 | OXYGEN SATURATION: 97 % | HEART RATE: 79 BPM | SYSTOLIC BLOOD PRESSURE: 110 MMHG | WEIGHT: 175 LBS | HEIGHT: 63 IN

## 2022-12-21 PROCEDURE — 99213 OFFICE O/P EST LOW 20 MIN: CPT

## 2022-12-21 NOTE — REASON FOR VISIT
[Follow-Up] : a follow-up visit [Sleep Apnea] : sleep apnea [Obesity] : obesity [TextBox_44] : Patient presents with daughter.  Reviewed her sleep study showing mild to moderate obstructive sleep apnea.  We had a long discussion regarding the results.

## 2022-12-21 NOTE — ASSESSMENT
[FreeTextEntry1] : Assessment:\par ANGELICA\par Obesity\par \par PLAN:\par The patient needs a PAP device .  We will order an auto titrating CPAP\par New supplies ordered \par Weight loss discussed\par I stressed the need maintain compliance  with the PAP device.\par The patient is not to use an Ozone or UV sterilizer. \par F/U in 3 months\par \par

## 2022-12-22 NOTE — HISTORY OF PRESENT ILLNESS
[FreeTextEntry1] : Patient with history of hypertension and sarcoidosis came to the hospital complaining of palpitations. Patient had palpitations for over 2.5 hours that terminated spontaneously. In the hospital, the EKG showed normal sinus rhythm. While hospitalized, the patient had a cardiac MRI that was negative for LGE, had a CTA that showed normal coronary arteries and she has preserved LV function. \par Patient wore a Holter monitor that showed no SVT. Currently the patient has had no further episodes since the hospitalization. She underwent ILR implant for further arrhythmia monitoring. \par \par She presents for wound check and device interrogation. \par \par CTA 8/24/22: showed normal coronary arteries\par MRI 8/30/22 showed normal biventricular function and volume, no evidence of LGE\par Event monitor 10/2022 showed normal rhythm, some PVCs and APCs. No AFib, no SVT.

## 2022-12-22 NOTE — ASSESSMENT
[FreeTextEntry1] : SVT s/p ILR implant\par - Wound is healing well. There are no signs or symptoms of infection of inflammation. There is no redness, no swelling, no erythema. The patient has no pain. \par - Suture excised as described above. Covered with bacitracin with DSD with instructions to reapply for 3 days daily and monitor site for s/s infection. \par - Device interrogation normal. I interrogated and reprogrammed her device as described above. \par - Patient is enrolled in remote monitoring services. I reviewed remote transmission process with the patient, as well as schedule and ability to do manual transmission. We also spoke about associated co-payments with remote monitoring that may not be covered by insurance. \par - Patient had iPhone home monitor but was not working. Was paired with new relay  monitor in office. \par - Continue metoprolol\par \par \par Sarcoidosis\par - No cardiac evidence of sarcoidosis on MRI\par - Continue metoprolol 25mg BID\par \par Hypertension\par - Patient's pressure well controlled\par - Continue Norvasc 5mg QD\par \par RTO in 2 months to reassess wound\par

## 2022-12-22 NOTE — PROCEDURE
[de-identified] : GABRIELA [de-identified] : LINQ II [de-identified] : [id number] [de-identified] : 11/8/2022 [de-identified] : Good [de-identified] : No events

## 2022-12-22 NOTE — PHYSICAL EXAM
[Well Developed] : well developed [Well Nourished] : well nourished [No Acute Distress] : no acute distress [Normal Conjunctiva] : normal conjunctiva [Normal Venous Pressure] : normal venous pressure [No Carotid Bruit] : no carotid bruit [Normal S1, S2] : normal S1, S2 [No Murmur] : no murmur [No Rub] : no rub [No Gallop] : no gallop [Clear Lung Fields] : clear lung fields [Good Air Entry] : good air entry [No Respiratory Distress] : no respiratory distress  [Soft] : abdomen soft [Non Tender] : non-tender [No Masses/organomegaly] : no masses/organomegaly [Normal Bowel Sounds] : normal bowel sounds [Normal Gait] : normal gait [No Edema] : no edema [No Cyanosis] : no cyanosis [No Clubbing] : no clubbing [No Varicosities] : no varicosities [No Rash] : no rash [No Skin Lesions] : no skin lesions [Moves all extremities] : moves all extremities [No Focal Deficits] : no focal deficits [Normal Speech] : normal speech [Alert and Oriented] : alert and oriented [Normal memory] : normal memory [General Appearance - Well Developed] : well developed [Normal Appearance] : normal appearance [Well Groomed] : well groomed [General Appearance - Well Nourished] : well nourished [No Deformities] : no deformities [General Appearance - In No Acute Distress] : no acute distress [Heart Rate And Rhythm] : heart rate and rhythm were normal [Heart Sounds] : normal S1 and S2 [Murmurs] : no murmurs present [] : no respiratory distress [Respiration, Rhythm And Depth] : normal respiratory rhythm and effort [Exaggerated Use Of Accessory Muscles For Inspiration] : no accessory muscle use [Clean] : clean [Dry] : dry [Healing Well] : healing well [Abdomen Soft] : soft [Nail Clubbing] : no clubbing of the fingernails [Cyanosis, Localized] : no localized cyanosis [Erythema] : not erythematous [Warm] : not warm [Tender] : not tender [FreeTextEntry2] : Visible suture cleaned with alcohol pad with some bleeding, suture excised [FreeTextEntry1] : Parasternal

## 2023-01-13 ENCOUNTER — APPOINTMENT (OUTPATIENT)
Dept: CARDIOLOGY | Facility: CLINIC | Age: 68
End: 2023-01-13

## 2023-01-20 ENCOUNTER — NON-APPOINTMENT (OUTPATIENT)
Age: 68
End: 2023-01-20

## 2023-01-20 ENCOUNTER — APPOINTMENT (OUTPATIENT)
Dept: CARDIOLOGY | Facility: CLINIC | Age: 68
End: 2023-01-20
Payer: MEDICARE

## 2023-01-20 PROCEDURE — G2066: CPT

## 2023-01-20 PROCEDURE — 93298 REM INTERROG DEV EVAL SCRMS: CPT

## 2023-01-26 ENCOUNTER — APPOINTMENT (OUTPATIENT)
Dept: PAIN MANAGEMENT | Facility: CLINIC | Age: 68
End: 2023-01-26
Payer: MEDICARE

## 2023-01-26 VITALS
DIASTOLIC BLOOD PRESSURE: 78 MMHG | BODY MASS INDEX: 31.01 KG/M2 | WEIGHT: 175 LBS | HEIGHT: 63 IN | SYSTOLIC BLOOD PRESSURE: 110 MMHG | HEART RATE: 86 BPM

## 2023-01-26 DIAGNOSIS — M48.02 SPINAL STENOSIS, CERVICAL REGION: ICD-10-CM

## 2023-01-26 PROCEDURE — 99213 OFFICE O/P EST LOW 20 MIN: CPT

## 2023-01-26 NOTE — DISCUSSION/SUMMARY
[de-identified] : Ms. Trinh Damon Is a 67-year-old female with a chief complaint of neck and mid back pain.  She has had this pain for a few months following no precipitating injury or event.  She has trialed 10-12 sessions of physical therapy with good relief. Initially physical therapy did not improve her symptoms as she started after a loop recorder was placed, but as of the last 2 months she has had good relief of her symptoms. She will continue with physical therapy 2-3x a week for 6-8 weeks. \par \par She is a candidate for a CHIRAG, at this time, though she is hesitant. If her pain does not improve with physical therapy we will send out for this. \par \par Patient will follow up in 6 weeks to review imaging results and discuss further treatment options.  All of this patient's questions were answered and she understood our conversation well. \par \par I, Quincy Guerra, attest that this documentation has been prepared under the direction and in the presence of Provider Angel García DO\par The documentation recorded by the scribe, in my presence, accurately reflects the service I personally performed, and the decisions made by me with my edits as appropriate.\par \par Best Regards, \par Angel García D.RAIZA. \par Diplomat, American Board of Anesthesiology \par Diplomat, American Board of Pain Medicine \par Diplomat, American Board of Pain Management

## 2023-01-26 NOTE — PHYSICAL EXAM
[de-identified] : GENERAL APPEARANCE OF PATIENT IS WELL DEVELOPED, WELL NOURISHED, BODY HABITUS NORMAL, WELL GROOMED, NO DEFORMITIES NOTED. \par Head - Atraumatic and Normocephalic \par Eyes, Nose, and Throat: External inspection of ears and nose are normal overall without scars, lesions, or masses noted. Assessment of hearing is normal\par Neck-Examination of neck shows no masses, overall appearance is normal, neck is symmetric, tracheal position is midline, no crepitus is noted. Examination of thyroid shows no enlargement, tenderness or masses\par Respiratory- Assessment of respiratory effort shows no intercostal retractions, no use of accessory muscles, unlabored breathing, and normal diaphragmatic movement.\par Cardiovascular- Examination of extremities show no edema or varicosities\par Musculoskeletal. Examination of gait is not antalgic and station is normal\par Inspection and palpation of digits and nails shows no clubbing, cyanosis, nodules, drainage, fluctuance, petechiae\par \par • Spine – inspection and palpation shows no misalignment, asymmetry, crepitation, defects, tenderness, masses, effusions. ROM is normal without crepitation or contracture. No instability or subluxation or laxity is noted. No abnormal movements.\par \par \par • Neck- pain at 15 degrees of flexion. Pain at 15 degrees of extension. \par \par \par • RUE- inspection and palpation shows no misalignment, asymmetry, crepitation, defects, tenderness, masses, effusions. ROM is normal without crepitation or contracture. No instability or subluxation or laxity is noted. No abnormal movements.\par \par \par • LUE- inspection and palpation shows no misalignment, asymmetry, crepitation, defects, tenderness, masses, effusions. ROM is normal without crepitation or contracture. No instability or subluxation or laxity is noted. No abnormal movements.\par \par \par • RLE- inspection and palpation shows no misalignment, asymmetry, crepitation, defects, tenderness, masses, effusions. ROM is normal without crepitation or contracture. No instability or subluxation or laxity is noted. No abnormal movements.\par \par \par • LLE- inspection and palpation shows no misalignment, asymmetry, crepitation, defects, tenderness, masses, effusions. ROM is normal without crepitation or contracture. No instability or subluxation or laxity is noted. No abnormal movements.\par \par \par • Skin- Inspection of skin and subcutaneous tissue shows no rashes, lesions or ulcers. Palpation of skin and subcutaneous tissue shows no rashes, no indurations, subcutaneous nodules or tightening.\par \par \par • Abdomen- Nontender\par \par \par • Neurologic- CN 2-12 are grossly intact. No sensory or motor deficits in the upper and lower extremities. Adequate strength in upper and lower extremities \par \par \par • Psychiatric- Patient’s judgment and insight are intact. Oriented to time, place and person. Recent and remote memory intact\par \par

## 2023-01-26 NOTE — HISTORY OF PRESENT ILLNESS
[FreeTextEntry1] : HISTORY OF PRESENT ILLNESS: This is a 67 year old woman complaining of neck and mid back pain. The patient has had this pain for a few months. The pain started after no specific injury or event. Patient describes pain as moderate to severe, rating 8/10 on the pain scale at worst. During the last month the pain has been nearly constant with symptoms worse in the afternoon. Pain described as sharp. Pain is associated with numbness and pins and needles into the upper extremities. Patient has weakness in the upper extremities. Pain is increased with lying down, sitting and coughing/sneezing. Bowel or bladder habits have not changed. \par  \par ACTIVITIES: Patient could walk less than a block before the pain starts. Patient can sit 1 hour  before pain starts. Patient can stand 30 minutes before pain starts. The patient often lays down because of pain. Patient uses no assistive walking device at this time. Patient has difficulty household chores at this time.\par \par PRIOR PAIN TREATMENTS:  No prior pain treatment\par \par PRIOR PAIN MEDICATIONS:  Tylenol\par \par PRESENTING TODAY: In revisit encounter in regard to her continued neck and mid back pain. Previously patient had a loop recorder in place and was cleared by cardiology. She notes that she has been attending physical therapy at this time with good relief. \par \par Covid 19 - This in-office encounter has occurred during a Public Health Emergency (PHE). As required by law, due to the risk of respiratory-transmitted infectious diseases, our office provided additional materials, supplies and clinical staff to assist in keeping our patients, physicians and office staff safe during this health emergency.\par

## 2023-02-17 ENCOUNTER — APPOINTMENT (OUTPATIENT)
Dept: ELECTROPHYSIOLOGY | Facility: CLINIC | Age: 68
End: 2023-02-17

## 2023-02-27 ENCOUNTER — OUTPATIENT (OUTPATIENT)
Dept: OUTPATIENT SERVICES | Facility: HOSPITAL | Age: 68
LOS: 1 days | End: 2023-02-27
Payer: MEDICARE

## 2023-02-27 DIAGNOSIS — Z12.31 ENCOUNTER FOR SCREENING MAMMOGRAM FOR MALIGNANT NEOPLASM OF BREAST: ICD-10-CM

## 2023-02-27 DIAGNOSIS — Z98.890 OTHER SPECIFIED POSTPROCEDURAL STATES: Chronic | ICD-10-CM

## 2023-02-27 DIAGNOSIS — Z00.8 ENCOUNTER FOR OTHER GENERAL EXAMINATION: ICD-10-CM

## 2023-02-27 PROCEDURE — 77063 BREAST TOMOSYNTHESIS BI: CPT

## 2023-02-27 PROCEDURE — 77067 SCR MAMMO BI INCL CAD: CPT | Mod: 26

## 2023-02-27 PROCEDURE — 77067 SCR MAMMO BI INCL CAD: CPT

## 2023-02-27 PROCEDURE — 77063 BREAST TOMOSYNTHESIS BI: CPT | Mod: 26

## 2023-02-28 DIAGNOSIS — Z12.31 ENCOUNTER FOR SCREENING MAMMOGRAM FOR MALIGNANT NEOPLASM OF BREAST: ICD-10-CM

## 2023-03-07 ENCOUNTER — APPOINTMENT (OUTPATIENT)
Dept: PULMONOLOGY | Facility: CLINIC | Age: 68
End: 2023-03-07
Payer: MEDICARE

## 2023-03-07 VITALS
HEART RATE: 75 BPM | WEIGHT: 174 LBS | OXYGEN SATURATION: 96 % | RESPIRATION RATE: 14 BRPM | SYSTOLIC BLOOD PRESSURE: 132 MMHG | DIASTOLIC BLOOD PRESSURE: 78 MMHG | BODY MASS INDEX: 30.83 KG/M2 | HEIGHT: 63 IN

## 2023-03-07 DIAGNOSIS — G47.33 OBSTRUCTIVE SLEEP APNEA (ADULT) (PEDIATRIC): ICD-10-CM

## 2023-03-07 PROCEDURE — 99214 OFFICE O/P EST MOD 30 MIN: CPT

## 2023-03-07 NOTE — REASON FOR VISIT
[Follow-Up] : a follow-up visit [Sleep Apnea] : sleep apnea [Shortness of Breath] : shortness of breath [Obesity] : obesity [TextBox_44] : History of sleep apnea now on CPAP with CPAP doing very well not CPAP doing very well not having any issues.  She is dramatic in clinic improvement in her sleepiness.\par \par She is currently is currently taking prednisone she uses this 3-4 times a  year for exacerbations..  Her primary thinks it is due to  secondhand smoke.  There is no asthma in the family.  She was never smoker.  She gets dramatic improvement when she is on the steroids for several days.  She has never been on any inhalers.

## 2023-03-07 NOTE — ASSESSMENT
[FreeTextEntry1] : Assessment:\par ANGELICA\par Obesity\par \par PLAN:\par The patient is benefitting from the PAP device .\par New supplies ordered \par Weight loss discussed\par I stressed the need maintain compliance  with the PAP device.\par The patient is not to use an Ozone or UV sterilizer. \par F/U in 12 months\par \par The patient possibly has supply has asthma  versus COPD\par We will start ICS/LABA\par Taper the prednisone as per primary follow-up and follow-up in 3 to 4 months

## 2023-03-10 ENCOUNTER — APPOINTMENT (OUTPATIENT)
Dept: PAIN MANAGEMENT | Facility: CLINIC | Age: 68
End: 2023-03-10
Payer: MEDICARE

## 2023-03-10 VITALS
HEIGHT: 63 IN | HEART RATE: 68 BPM | BODY MASS INDEX: 30.83 KG/M2 | WEIGHT: 174 LBS | SYSTOLIC BLOOD PRESSURE: 138 MMHG | DIASTOLIC BLOOD PRESSURE: 64 MMHG

## 2023-03-10 DIAGNOSIS — M54.12 RADICULOPATHY, CERVICAL REGION: ICD-10-CM

## 2023-03-10 DIAGNOSIS — M50.10 CERVICAL DISC DISORDER WITH RADICULOPATHY, UNSPECIFIED CERVICAL REGION: ICD-10-CM

## 2023-03-10 DIAGNOSIS — M47.812 SPONDYLOSIS W/OUT MYELOPATHY OR RADICULOPATHY, CERVICAL REGION: ICD-10-CM

## 2023-03-10 PROCEDURE — 99213 OFFICE O/P EST LOW 20 MIN: CPT

## 2023-03-10 NOTE — HISTORY OF PRESENT ILLNESS
[FreeTextEntry1] : HISTORY OF PRESENT ILLNESS: This is a 67 year old woman complaining of neck and mid back pain. The patient has had this pain for a few months. The pain started after no specific injury or event. Patient describes pain as moderate to severe, rating 8/10 on the pain scale at worst. During the last month the pain has been nearly constant with symptoms worse in the afternoon. Pain described as sharp. Pain is associated with numbness and pins and needles into the upper extremities. Patient has weakness in the upper extremities. Pain is increased with lying down, sitting and coughing/sneezing. Bowel or bladder habits have not changed. \par  \par ACTIVITIES: Patient could walk less than a block before the pain starts. Patient can sit 1 hour  before pain starts. Patient can stand 30 minutes before pain starts. The patient often lays down because of pain. Patient uses no assistive walking device at this time. Patient has difficulty household chores at this time.\par \par PRIOR PAIN TREATMENTS:  No prior pain treatment\par \par PRIOR PAIN MEDICATIONS:  Tylenol\par \par PRESENTING TODAY: In revisit encounter in regard to her continued neck and mid back pain.Last seen on 01/26/2023. She reports that she is attending Physical Therapy with excellent results and reports over 60% pain relief. We will continue PT at this time.\par \par

## 2023-03-10 NOTE — PHYSICAL EXAM
[] : diminished ROM in all planes [Flexion] : flexion [de-identified] : GENERAL APPEARANCE OF PATIENT IS WELL DEVELOPED, WELL NOURISHED, BODY HABITUS NORMAL, WELL GROOMED, NO DEFORMITIES NOTED. \par Head - Atraumatic and Normocephalic \par Eyes, Nose, and Throat: External inspection of ears and nose are normal overall without scars, lesions, or masses noted. Assessment of hearing is normal\par Neck-Examination of neck shows no masses, overall appearance is normal, neck is symmetric, tracheal position is midline, no crepitus is noted. Examination of thyroid shows no enlargement, tenderness or masses\par Respiratory- Assessment of respiratory effort shows no intercostal retractions, no use of accessory muscles, unlabored breathing, and normal diaphragmatic movement.\par Cardiovascular- Examination of extremities show no edema or varicosities\par Musculoskeletal. Examination of gait is not antalgic and station is normal\par Inspection and palpation of digits and nails shows no clubbing, cyanosis, nodules, drainage, fluctuance, petechiae\par \par • Spine – inspection and palpation shows no misalignment, asymmetry, crepitation, defects, tenderness, masses, effusions. ROM is normal without crepitation or contracture. No instability or subluxation or laxity is noted. No abnormal movements.\par \par \par • Neck- pain at 15 degrees of flexion. Pain at 15 degrees of extension. \par \par \par • RUE- inspection and palpation shows no misalignment, asymmetry, crepitation, defects, tenderness, masses, effusions. ROM is normal without crepitation or contracture. No instability or subluxation or laxity is noted. No abnormal movements.\par \par \par • LUE- inspection and palpation shows no misalignment, asymmetry, crepitation, defects, tenderness, masses, effusions. ROM is normal without crepitation or contracture. No instability or subluxation or laxity is noted. No abnormal movements.\par \par \par • RLE- inspection and palpation shows no misalignment, asymmetry, crepitation, defects, tenderness, masses, effusions. ROM is normal without crepitation or contracture. No instability or subluxation or laxity is noted. No abnormal movements.\par \par \par • LLE- inspection and palpation shows no misalignment, asymmetry, crepitation, defects, tenderness, masses, effusions. ROM is normal without crepitation or contracture. No instability or subluxation or laxity is noted. No abnormal movements.\par \par \par • Skin- Inspection of skin and subcutaneous tissue shows no rashes, lesions or ulcers. Palpation of skin and subcutaneous tissue shows no rashes, no indurations, subcutaneous nodules or tightening.\par \par \par • Abdomen- Nontender\par \par \par • Neurologic- CN 2-12 are grossly intact. No sensory or motor deficits in the upper and lower extremities. Adequate strength in upper and lower extremities \par \par \par • Psychiatric- Patient’s judgment and insight are intact. Oriented to time, place and person. Recent and remote memory intact\par \par

## 2023-03-10 NOTE — DISCUSSION/SUMMARY
[de-identified] : Ms. Trinh Damon Is a 67-year-old female with a chief complaint of neck and mid back pain.  She has had this pain for a few months following no precipitating injury or event. She is attending Physical Therapy with excellent relief and will continue at this time. She will come back for re evaluation in 8 weeks.\par \carolann Mendez PA-C\carolann García D.O. \par

## 2023-03-20 ENCOUNTER — NON-APPOINTMENT (OUTPATIENT)
Age: 68
End: 2023-03-20

## 2023-03-20 ENCOUNTER — APPOINTMENT (OUTPATIENT)
Dept: CARDIOLOGY | Facility: CLINIC | Age: 68
End: 2023-03-20
Payer: MEDICARE

## 2023-03-20 PROCEDURE — 93298 REM INTERROG DEV EVAL SCRMS: CPT

## 2023-03-20 PROCEDURE — G2066: CPT

## 2023-04-24 ENCOUNTER — APPOINTMENT (OUTPATIENT)
Dept: ELECTROPHYSIOLOGY | Facility: CLINIC | Age: 68
End: 2023-04-24
Payer: MEDICARE

## 2023-04-24 VITALS
BODY MASS INDEX: 30.83 KG/M2 | WEIGHT: 174 LBS | HEIGHT: 63 IN | HEART RATE: 63 BPM | TEMPERATURE: 98 F | SYSTOLIC BLOOD PRESSURE: 119 MMHG | DIASTOLIC BLOOD PRESSURE: 74 MMHG

## 2023-04-24 PROCEDURE — 93285 PRGRMG DEV EVAL SCRMS IP: CPT

## 2023-04-24 PROCEDURE — 93000 ELECTROCARDIOGRAM COMPLETE: CPT | Mod: 59

## 2023-04-24 PROCEDURE — 99214 OFFICE O/P EST MOD 30 MIN: CPT

## 2023-04-24 RX ORDER — CELECOXIB 200 MG/1
200 CAPSULE ORAL DAILY
Refills: 0 | Status: COMPLETED | COMMUNITY
End: 2023-04-24

## 2023-04-24 NOTE — PHYSICAL EXAM
[General Appearance - Well Developed] : well developed [Normal Appearance] : normal appearance [Well Groomed] : well groomed [General Appearance - Well Nourished] : well nourished [No Deformities] : no deformities [General Appearance - In No Acute Distress] : no acute distress [Heart Rate And Rhythm] : heart rate and rhythm were normal [Heart Sounds] : normal S1 and S2 [Murmurs] : no murmurs present [] : no respiratory distress [Respiration, Rhythm And Depth] : normal respiratory rhythm and effort [Exaggerated Use Of Accessory Muscles For Inspiration] : no accessory muscle use [Clean] : clean [Dry] : dry [Abdomen Soft] : soft [Nail Clubbing] : no clubbing of the fingernails [Cyanosis, Localized] : no localized cyanosis [Well Developed] : well developed [Well Nourished] : well nourished [No Acute Distress] : no acute distress [Normal Conjunctiva] : normal conjunctiva [Normal Venous Pressure] : normal venous pressure [No Carotid Bruit] : no carotid bruit [Normal S1, S2] : normal S1, S2 [No Murmur] : no murmur [No Rub] : no rub [No Gallop] : no gallop [Clear Lung Fields] : clear lung fields [Good Air Entry] : good air entry [No Respiratory Distress] : no respiratory distress  [Soft] : abdomen soft [Non Tender] : non-tender [No Masses/organomegaly] : no masses/organomegaly [Normal Bowel Sounds] : normal bowel sounds [Normal Gait] : normal gait [No Edema] : no edema [No Cyanosis] : no cyanosis [No Clubbing] : no clubbing [No Varicosities] : no varicosities [No Rash] : no rash [No Skin Lesions] : no skin lesions [Moves all extremities] : moves all extremities [No Focal Deficits] : no focal deficits [Normal Speech] : normal speech [Alert and Oriented] : alert and oriented [Normal memory] : normal memory [Well-Healed] : well-healed [Erythema] : not erythematous [Warm] : not warm [Tender] : not tender [FreeTextEntry1] : Parasternal

## 2023-04-24 NOTE — PROCEDURE
[No] : not [NSR] : normal sinus rhythm [Normal] : The battery status is normal. [Sensing Amplitude ___mv] : sensing amplitude was [unfilled] mv [de-identified] : Medtronic [de-identified] : Reveal LINQ II [de-identified] : [id number] [de-identified] : 11/8/2022 [de-identified] : Tachy zone lowered to 146 bpm [de-identified] : 5 Tachy episodes. Longest 43 seconds., c/w AT/SVT

## 2023-04-24 NOTE — ASSESSMENT
[FreeTextEntry1] : SVT s/p ILR implant\par - Wound is well healed. There are no signs or symptoms of infection of inflammation. There is no redness, no swelling, no erythema. The patient has no pain. \par - Device interrogation normal. I interrogated and reprogrammed her device as described above. \par - 5 episodes AT/SVT, longest 43 seconds. Patient can recall no symptoms. \par - She is on remote and transmitting\par \par PSVT\par - Continue metoprolol tartrate 25mg BID. Can titrate up if episodes persist/symptoms develop.\par - Discussed role of EPS/ possible ablation- will continue to monitor since episodes are brief and self terminate without symptoms.\par - Advised to avoid caffeine and maintain adequaste hydration.\par - Plan discussed with patient's hernandezter on speaker as well. \par \par Sarcoidosis\par - No cardiac evidence of sarcoidosis on MRI\par - Continue metoprolol 25mg BID\par \par Hypertension\par - Patient's pressure well controlled\par - Continue Norvasc 5mg QD\par \par RTO in 9-12 months\par

## 2023-04-24 NOTE — CARDIOLOGY SUMMARY
[de-identified] : 4/24/2023: sinus rhythm 63 bpm [de-identified] : Echo 8/26/22: EF 50%, normal left atrial size, no major valvar disease

## 2023-04-24 NOTE — HISTORY OF PRESENT ILLNESS
[FreeTextEntry1] : EP: Dr. Thomas\par Cardiologist: Dr. Aleta Alberto\par \par Preferred language: Thai (She is fluent in English)\par \par Patient with history of hypertension and sarcoidosis came to the hospital complaining of palpitations. Patient had palpitations for over 2.5 hours that terminated spontaneously. In the hospital, the EKG showed normal sinus rhythm. While hospitalized, the patient had a cardiac MRI that was negative for LGE, had a CTA that showed normal coronary arteries and she has preserved LV function. \par Patient wore a Holter monitor that showed no SVT. Currently the patient has had no further episodes since the hospitalization. She underwent ILR implant for further arrhythmia monitoring. \par \par She presents for wound check and device interrogation. \par \par CTA 8/24/22: showed normal coronary arteries\par MRI 8/30/22 showed normal biventricular function and volume, no evidence of LGE\par Event monitor 10/2022 showed normal rhythm, some PVCs and APCs. No AFib, no SVT.

## 2023-05-08 ENCOUNTER — APPOINTMENT (OUTPATIENT)
Dept: PAIN MANAGEMENT | Facility: CLINIC | Age: 68
End: 2023-05-08

## 2023-05-25 ENCOUNTER — APPOINTMENT (OUTPATIENT)
Dept: CARDIOLOGY | Facility: CLINIC | Age: 68
End: 2023-05-25
Payer: MEDICARE

## 2023-05-25 ENCOUNTER — NON-APPOINTMENT (OUTPATIENT)
Age: 68
End: 2023-05-25

## 2023-05-25 PROCEDURE — G2066: CPT

## 2023-05-25 PROCEDURE — 93298 REM INTERROG DEV EVAL SCRMS: CPT

## 2023-06-13 ENCOUNTER — APPOINTMENT (OUTPATIENT)
Dept: PULMONOLOGY | Facility: CLINIC | Age: 68
End: 2023-06-13
Payer: MEDICARE

## 2023-06-13 VITALS
RESPIRATION RATE: 14 BRPM | SYSTOLIC BLOOD PRESSURE: 130 MMHG | HEART RATE: 74 BPM | WEIGHT: 180 LBS | DIASTOLIC BLOOD PRESSURE: 70 MMHG | HEIGHT: 63 IN | OXYGEN SATURATION: 92 % | BODY MASS INDEX: 31.89 KG/M2

## 2023-06-13 DIAGNOSIS — D86.9 SARCOIDOSIS, UNSPECIFIED: ICD-10-CM

## 2023-06-13 DIAGNOSIS — J45.909 UNSPECIFIED ASTHMA, UNCOMPLICATED: ICD-10-CM

## 2023-06-13 DIAGNOSIS — E66.9 OBESITY, UNSPECIFIED: ICD-10-CM

## 2023-06-13 PROCEDURE — 99213 OFFICE O/P EST LOW 20 MIN: CPT

## 2023-06-13 NOTE — REASON FOR VISIT
[Follow-Up] : a follow-up visit [Sleep Apnea] : sleep apnea [Sarcoidosis] : sarcoidosis [Obesity] : obesity [TextBox_44] : Patient with history of sleep apnea she is using the machine not having much issues.  She has a history of sarcoidosis with elements of fibrotic changes.  She is followed by Dr. Elli Ross for sarcoidosis with posterior inhaled steroids..  She was given steroids by the PA in the office.  She is complaining that her legs are very swollen.  However, her breathing is not getting any better.

## 2023-06-13 NOTE — ASSESSMENT
[FreeTextEntry1] : Assessment:\par ANGELICA\par Obesity\par \par PLAN:\par The patient is benefitting from the PAP device .\par New supplies ordered \par Weight loss discussed\par I stressed the need maintain compliance  with the PAP device.\par The patient is not to use an Ozone or UV sterilizer. \par F/U in 12 months\par \par The patient possibly has supply has asthma  versus COPD\par We will start ICS/LABA\par Taper the prednisone as per primary follow-up and follow-up in 3 to 4 months \par The patient is going to follow-up with Dr. Ross regarding the sarcoidosis.

## 2023-06-29 ENCOUNTER — APPOINTMENT (OUTPATIENT)
Dept: CARDIOLOGY | Facility: CLINIC | Age: 68
End: 2023-06-29
Payer: MEDICARE

## 2023-06-29 ENCOUNTER — NON-APPOINTMENT (OUTPATIENT)
Age: 68
End: 2023-06-29

## 2023-06-29 PROCEDURE — G2066: CPT

## 2023-06-29 PROCEDURE — 93298 REM INTERROG DEV EVAL SCRMS: CPT

## 2023-08-03 ENCOUNTER — NON-APPOINTMENT (OUTPATIENT)
Age: 68
End: 2023-08-03

## 2023-08-03 ENCOUNTER — APPOINTMENT (OUTPATIENT)
Dept: CARDIOLOGY | Facility: CLINIC | Age: 68
End: 2023-08-03
Payer: MEDICARE

## 2023-08-03 PROCEDURE — 93298 REM INTERROG DEV EVAL SCRMS: CPT

## 2023-08-03 PROCEDURE — G2066: CPT

## 2023-09-07 ENCOUNTER — APPOINTMENT (OUTPATIENT)
Dept: CARDIOLOGY | Facility: CLINIC | Age: 68
End: 2023-09-07
Payer: MEDICARE

## 2023-09-07 ENCOUNTER — NON-APPOINTMENT (OUTPATIENT)
Age: 68
End: 2023-09-07

## 2023-09-07 PROCEDURE — G2066: CPT

## 2023-09-07 PROCEDURE — 93298 REM INTERROG DEV EVAL SCRMS: CPT

## 2023-09-11 ENCOUNTER — APPOINTMENT (OUTPATIENT)
Dept: OBGYN | Facility: CLINIC | Age: 68
End: 2023-09-11

## 2023-09-20 ENCOUNTER — APPOINTMENT (OUTPATIENT)
Dept: SPEECH THERAPY | Facility: CLINIC | Age: 68
End: 2023-09-20

## 2023-10-12 ENCOUNTER — APPOINTMENT (OUTPATIENT)
Dept: CARDIOLOGY | Facility: CLINIC | Age: 68
End: 2023-10-12
Payer: MEDICARE

## 2023-10-12 ENCOUNTER — NON-APPOINTMENT (OUTPATIENT)
Age: 68
End: 2023-10-12

## 2023-10-13 PROCEDURE — G2066: CPT

## 2023-10-13 PROCEDURE — 93298 REM INTERROG DEV EVAL SCRMS: CPT

## 2023-11-16 ENCOUNTER — NON-APPOINTMENT (OUTPATIENT)
Age: 68
End: 2023-11-16

## 2023-11-16 ENCOUNTER — APPOINTMENT (OUTPATIENT)
Dept: CARDIOLOGY | Facility: CLINIC | Age: 68
End: 2023-11-16
Payer: MEDICARE

## 2023-11-17 PROCEDURE — G2066: CPT

## 2023-11-17 PROCEDURE — 93298 REM INTERROG DEV EVAL SCRMS: CPT | Mod: NC

## 2023-12-21 ENCOUNTER — NON-APPOINTMENT (OUTPATIENT)
Age: 68
End: 2023-12-21

## 2023-12-21 ENCOUNTER — APPOINTMENT (OUTPATIENT)
Dept: CARDIOLOGY | Facility: CLINIC | Age: 68
End: 2023-12-21
Payer: MEDICARE

## 2023-12-22 PROCEDURE — G2066: CPT

## 2023-12-22 PROCEDURE — 93298 REM INTERROG DEV EVAL SCRMS: CPT

## 2023-12-30 ENCOUNTER — NON-APPOINTMENT (OUTPATIENT)
Age: 68
End: 2023-12-30

## 2024-01-22 ENCOUNTER — APPOINTMENT (OUTPATIENT)
Dept: ELECTROPHYSIOLOGY | Facility: CLINIC | Age: 69
End: 2024-01-22
Payer: MEDICARE

## 2024-01-22 VITALS
WEIGHT: 164 LBS | HEART RATE: 85 BPM | DIASTOLIC BLOOD PRESSURE: 87 MMHG | SYSTOLIC BLOOD PRESSURE: 145 MMHG | HEIGHT: 63 IN | BODY MASS INDEX: 29.06 KG/M2 | TEMPERATURE: 98 F

## 2024-01-22 DIAGNOSIS — R00.0 TACHYCARDIA, UNSPECIFIED: ICD-10-CM

## 2024-01-22 PROCEDURE — 99214 OFFICE O/P EST MOD 30 MIN: CPT

## 2024-01-22 RX ORDER — GABAPENTIN 100 MG
100 TABLET ORAL DAILY
Refills: 0 | Status: COMPLETED | COMMUNITY
End: 2024-01-22

## 2024-02-02 NOTE — HISTORY OF PRESENT ILLNESS
[FreeTextEntry1] : EP: Dr. Thomas Cardiologist: Dr. Aleta Alberto  Preferred language: Vietnamese (She is fluent in English)  Patient with history of hypertension and sarcoidosis came to the hospital complaining of palpitations. Patient had palpitations for over 2.5 hours that terminated spontaneously. In the hospital, the EKG showed normal sinus rhythm. While hospitalized, the patient had a cardiac MRI that was negative for LGE, had a CTA that showed normal coronary arteries and she has preserved LV function.  Patient wore a Holter monitor that showed no SVT. Currently the patient has had no further episodes since the hospitalization. She underwent ILR implant for further arrhythmia monitoring.   She presents for wound check and device interrogation.   CTA 8/24/22: showed normal coronary arteries MRI 8/30/22 showed normal biventricular function and volume, no evidence of LGE Event monitor 10/2022 showed normal rhythm, some PVCs and APCs. No AFib, no SVT.

## 2024-02-02 NOTE — ASSESSMENT
[FreeTextEntry1] : SVT s/p ILR implant  - Device interrogation normal. I interrogated and reprogrammed her device as described above.  - Tachy episodes as described above.   - She is on remote and transmitting    PSVT  - Continue metoprolol tartrate 25mg BID. Last episode 9/2023. Likely SVT with abberancy but WCT - cannot r/o ischemia. I recommend ischemic w/u - will order 2D echo and nuc stress. Can titrate up BB if episodes persist/symptoms develop.   - Again discussed role of EPS/ possible ablation- will continue to monitor since episodes are brief and self terminate without symptoms.  - Advised to avoid caffeine and maintain adequate hydration.  - Plan discussed with patient's daughter on speaker as well.  # PAF - isolated episode as described above. - Continue Eliquis 5mg BID. No s/s bleeding reported.  - I recommend CBC, BMP at least yearly. - Consider ablation if burden increases and/or symptoms.   Sarcoidosis  - No cardiac evidence of sarcoidosis on MRI  - Continue metoprolol 25mg BID    Hypertension  - Patient's pressure elevated.   - Continue Losartan-HCTZ 100-12.5mg, metoprolol, furosemide 40mg. - BP log at home.     RTO in 2-3 months on day when Dr. Thomas is here, review results and discuss plan. Ablation?

## 2024-02-02 NOTE — PHYSICAL EXAM
[General Appearance - Well Developed] : well developed [Normal Appearance] : normal appearance [Well Groomed] : well groomed [General Appearance - Well Nourished] : well nourished [No Deformities] : no deformities [General Appearance - In No Acute Distress] : no acute distress [Heart Rate And Rhythm] : heart rate and rhythm were normal [Heart Sounds] : normal S1 and S2 [Murmurs] : no murmurs present [] : no respiratory distress [Respiration, Rhythm And Depth] : normal respiratory rhythm and effort [Exaggerated Use Of Accessory Muscles For Inspiration] : no accessory muscle use [Clean] : clean [Dry] : dry [Well-Healed] : well-healed [Abdomen Soft] : soft [Nail Clubbing] : no clubbing of the fingernails [Cyanosis, Localized] : no localized cyanosis [Well Developed] : well developed [Well Nourished] : well nourished [No Acute Distress] : no acute distress [Normal Conjunctiva] : normal conjunctiva [Normal Venous Pressure] : normal venous pressure [No Carotid Bruit] : no carotid bruit [Normal S1, S2] : normal S1, S2 [No Murmur] : no murmur [No Rub] : no rub [No Gallop] : no gallop [Clear Lung Fields] : clear lung fields [Good Air Entry] : good air entry [No Respiratory Distress] : no respiratory distress  [Soft] : abdomen soft [Non Tender] : non-tender [No Masses/organomegaly] : no masses/organomegaly [Normal Bowel Sounds] : normal bowel sounds [Normal Gait] : normal gait [No Edema] : no edema [No Cyanosis] : no cyanosis [No Clubbing] : no clubbing [No Varicosities] : no varicosities [No Rash] : no rash [No Skin Lesions] : no skin lesions [Moves all extremities] : moves all extremities [No Focal Deficits] : no focal deficits [Normal Speech] : normal speech [Alert and Oriented] : alert and oriented [Normal memory] : normal memory [Auscultation Breath Sounds / Voice Sounds] : lungs were clear to auscultation bilaterally [Erythema] : not erythematous [Warm] : not warm [Tender] : not tender [FreeTextEntry1] : Parasternal

## 2024-02-02 NOTE — CARDIOLOGY SUMMARY
[de-identified] : 4/24/2023: sinus rhythm 63 bpm [de-identified] : Echo 8/26/22: EF 50%, normal left atrial size, no major valvar disease

## 2024-02-02 NOTE — PROCEDURE
[No] : not [NSR] : normal sinus rhythm [Medtronic] : Medtronic [Normal] : The battery status is normal. [Sensing Amplitude ___mv] : sensing amplitude was [unfilled] mv [None] : none [Programmed for Longevity] : output reprogrammed for improved battery longevity [de-identified] : 78 BPM [de-identified] : Medtronic [de-identified] : Reveal LINQ II [de-identified] : [id number] [de-identified] : 11/8/2022 [de-identified] : Tachy zone lowered to 146 bpm [de-identified] : 12 tachy longest 4 min 58 seconds, likely SVT with abberancy 1 AF episode 2 hours 38 minutes 1/6/24 AF burden <0.1%

## 2024-02-26 ENCOUNTER — APPOINTMENT (OUTPATIENT)
Dept: CARDIOLOGY | Facility: CLINIC | Age: 69
End: 2024-02-26
Payer: MEDICARE

## 2024-02-26 ENCOUNTER — NON-APPOINTMENT (OUTPATIENT)
Age: 69
End: 2024-02-26

## 2024-02-26 PROCEDURE — 93298 REM INTERROG DEV EVAL SCRMS: CPT

## 2024-03-01 ENCOUNTER — OUTPATIENT (OUTPATIENT)
Dept: OUTPATIENT SERVICES | Facility: HOSPITAL | Age: 69
LOS: 1 days | End: 2024-03-01
Payer: MEDICARE

## 2024-03-01 DIAGNOSIS — Z12.31 ENCOUNTER FOR SCREENING MAMMOGRAM FOR MALIGNANT NEOPLASM OF BREAST: ICD-10-CM

## 2024-03-01 DIAGNOSIS — Z98.890 OTHER SPECIFIED POSTPROCEDURAL STATES: Chronic | ICD-10-CM

## 2024-03-01 PROCEDURE — 77063 BREAST TOMOSYNTHESIS BI: CPT | Mod: 26

## 2024-03-01 PROCEDURE — 77063 BREAST TOMOSYNTHESIS BI: CPT

## 2024-03-01 PROCEDURE — 77067 SCR MAMMO BI INCL CAD: CPT

## 2024-03-01 PROCEDURE — 77067 SCR MAMMO BI INCL CAD: CPT | Mod: 26

## 2024-03-02 DIAGNOSIS — Z12.31 ENCOUNTER FOR SCREENING MAMMOGRAM FOR MALIGNANT NEOPLASM OF BREAST: ICD-10-CM

## 2024-03-22 ENCOUNTER — APPOINTMENT (OUTPATIENT)
Dept: ELECTROPHYSIOLOGY | Facility: CLINIC | Age: 69
End: 2024-03-22
Payer: MEDICARE

## 2024-03-22 VITALS
TEMPERATURE: 97.8 F | BODY MASS INDEX: 29.06 KG/M2 | HEART RATE: 86 BPM | DIASTOLIC BLOOD PRESSURE: 81 MMHG | WEIGHT: 164 LBS | RESPIRATION RATE: 17 BRPM | SYSTOLIC BLOOD PRESSURE: 125 MMHG | HEIGHT: 63 IN

## 2024-03-22 DIAGNOSIS — I10 ESSENTIAL (PRIMARY) HYPERTENSION: ICD-10-CM

## 2024-03-22 DIAGNOSIS — I47.10 SUPRAVENTRICULAR TACHYCARDIA, UNSPECIFIED: ICD-10-CM

## 2024-03-22 DIAGNOSIS — Z45.09 ENCOUNTER FOR ADJUSTMENT AND MANAGEMENT OF OTHER CARDIAC DEVICE: ICD-10-CM

## 2024-03-22 DIAGNOSIS — R00.2 PALPITATIONS: ICD-10-CM

## 2024-03-22 DIAGNOSIS — I48.0 PAROXYSMAL ATRIAL FIBRILLATION: ICD-10-CM

## 2024-03-22 PROCEDURE — 99214 OFFICE O/P EST MOD 30 MIN: CPT

## 2024-03-22 RX ORDER — FAMOTIDINE 40 MG/1
40 TABLET, FILM COATED ORAL
Qty: 30 | Refills: 3 | Status: ACTIVE | COMMUNITY

## 2024-03-22 RX ORDER — LOSARTAN POTASSIUM AND HYDROCHLOROTHIAZIDE 12.5; 1 MG/1; MG/1
100-12.5 TABLET ORAL DAILY
Refills: 0 | Status: ACTIVE | COMMUNITY

## 2024-03-22 RX ORDER — ASPIRIN ENTERIC COATED TABLETS 81 MG 81 MG/1
81 TABLET, DELAYED RELEASE ORAL DAILY
Refills: 0 | Status: ACTIVE | COMMUNITY

## 2024-03-22 RX ORDER — PSYLLIUM HUSK 0.4 G
CAPSULE ORAL 3 TIMES DAILY
Refills: 0 | Status: ACTIVE | COMMUNITY

## 2024-03-22 RX ORDER — CELECOXIB 200 MG/1
200 CAPSULE ORAL
Refills: 0 | Status: ACTIVE | COMMUNITY

## 2024-03-22 RX ORDER — CHOLECALCIFEROL (VITAMIN D3) 50 MCG
2000 CAPSULE ORAL DAILY
Refills: 0 | Status: ACTIVE | COMMUNITY

## 2024-03-22 RX ORDER — FLUTICASONE PROPIONATE AND SALMETEROL 250; 50 UG/1; UG/1
250-50 POWDER RESPIRATORY (INHALATION) TWICE DAILY
Qty: 1 | Refills: 5 | Status: COMPLETED | COMMUNITY
End: 2024-03-22

## 2024-03-22 RX ORDER — AMLODIPINE BESYLATE 5 MG/1
5 TABLET ORAL
Refills: 0 | Status: ACTIVE | COMMUNITY

## 2024-03-22 RX ORDER — MONTELUKAST SODIUM 10 MG/1
10 TABLET, FILM COATED ORAL DAILY
Refills: 0 | Status: COMPLETED | COMMUNITY
End: 2024-03-22

## 2024-03-22 RX ORDER — FUROSEMIDE 40 MG/1
40 TABLET ORAL WEEKLY
Refills: 3 | Status: ACTIVE | COMMUNITY

## 2024-03-22 RX ORDER — METOPROLOL TARTRATE 25 MG/1
25 TABLET, FILM COATED ORAL TWICE DAILY
Refills: 0 | Status: ACTIVE | COMMUNITY

## 2024-03-27 PROBLEM — I47.10 PAROXYSMAL SVT (SUPRAVENTRICULAR TACHYCARDIA): Status: ACTIVE | Noted: 2022-10-12

## 2024-03-27 PROBLEM — R00.2 PALPITATIONS: Status: ACTIVE | Noted: 2022-10-12

## 2024-03-27 PROBLEM — I10 HYPERTENSION: Status: ACTIVE | Noted: 2022-10-12

## 2024-03-27 PROBLEM — Z45.09 ENCOUNTER FOR LOOP RECORDER CHECK: Status: ACTIVE | Noted: 2022-12-09

## 2024-03-27 PROBLEM — I48.0 PAF (PAROXYSMAL ATRIAL FIBRILLATION): Status: ACTIVE | Noted: 2024-01-22

## 2024-03-27 RX ORDER — APIXABAN 5 MG/1
5 TABLET, FILM COATED ORAL
Qty: 60 | Refills: 6 | Status: ACTIVE | COMMUNITY
Start: 2024-01-22

## 2024-03-27 NOTE — PHYSICAL EXAM
[Normal Appearance] : normal appearance [General Appearance - Well Developed] : well developed [Well Groomed] : well groomed [General Appearance - Well Nourished] : well nourished [No Deformities] : no deformities [Heart Rate And Rhythm] : heart rate and rhythm were normal [General Appearance - In No Acute Distress] : no acute distress [Heart Sounds] : normal S1 and S2 [Murmurs] : no murmurs present [Respiration, Rhythm And Depth] : normal respiratory rhythm and effort [] : no respiratory distress [Auscultation Breath Sounds / Voice Sounds] : lungs were clear to auscultation bilaterally [Exaggerated Use Of Accessory Muscles For Inspiration] : no accessory muscle use [Clean] : clean [Dry] : dry [Well-Healed] : well-healed [Abdomen Soft] : soft [Nail Clubbing] : no clubbing of the fingernails [Cyanosis, Localized] : no localized cyanosis [Well Developed] : well developed [Well Nourished] : well nourished [No Acute Distress] : no acute distress [Normal Conjunctiva] : normal conjunctiva [Normal Venous Pressure] : normal venous pressure [No Carotid Bruit] : no carotid bruit [Normal S1, S2] : normal S1, S2 [No Murmur] : no murmur [No Rub] : no rub [No Gallop] : no gallop [Clear Lung Fields] : clear lung fields [Good Air Entry] : good air entry [No Respiratory Distress] : no respiratory distress  [Soft] : abdomen soft [Non Tender] : non-tender [No Masses/organomegaly] : no masses/organomegaly [Normal Bowel Sounds] : normal bowel sounds [Normal Gait] : normal gait [No Edema] : no edema [No Cyanosis] : no cyanosis [No Clubbing] : no clubbing [No Varicosities] : no varicosities [No Rash] : no rash [No Skin Lesions] : no skin lesions [Moves all extremities] : moves all extremities [Normal Speech] : normal speech [No Focal Deficits] : no focal deficits [Alert and Oriented] : alert and oriented [Normal memory] : normal memory [Erythema] : not erythematous [Tender] : not tender [Warm] : not warm [FreeTextEntry1] : Parasternal

## 2024-03-27 NOTE — HISTORY OF PRESENT ILLNESS
[FreeTextEntry1] : EP: Dr. Thomas Cardiologist: Dr. Aleta Alberto  Preferred language: Welsh (She is fluent in English)  Patient with history of hypertension and sarcoidosis came to the hospital complaining of palpitations. Patient had palpitations for over 2.5 hours that terminated spontaneously. In the hospital, the EKG showed normal sinus rhythm. While hospitalized, the patient had a cardiac MRI that was negative for LGE, had a CTA that showed normal coronary arteries and she has preserved LV function.  Patient wore a Holter monitor that showed no SVT. Currently the patient has had no further episodes since the hospitalization. She underwent ILR implant for further arrhythmia monitoring.   She presents for wound check and device interrogation.   CTA 8/24/22: showed normal coronary arteries MRI 8/30/22 showed normal biventricular function and volume, no evidence of LGE Event monitor 10/2022 showed normal rhythm, some PVCs and APCs. No AFib, no SVT.

## 2024-03-27 NOTE — CARDIOLOGY SUMMARY
[de-identified] : 4/24/2023: sinus rhythm 63 bpm [de-identified] : Echo 8/26/22: EF 50%, normal left atrial size, no major valvar disease

## 2024-03-27 NOTE — PROCEDURE
[No] : not [NSR] : normal sinus rhythm [Medtronic] : Medtronic [Normal] : The battery status is normal. [None] : none [Sensing Amplitude ___mv] : sensing amplitude was [unfilled] mv [Programmed for Longevity] : output reprogrammed for improved battery longevity [de-identified] : 78 BPM [de-identified] : Medtronic [de-identified] : Reveal LINQ II [de-identified] : [id number] [de-identified] : 11/8/2022 [de-identified] : Tachy zone lowered to 146 bpm [de-identified] : NO events

## 2024-03-27 NOTE — ASSESSMENT
[FreeTextEntry1] : SVT s/p ILR implant  - Device interrogation normal. I interrogated and reprogrammed her device as described above.  - Tachy episodes as described above.   - She is on remote and transmitting    PSVT  - Continue metoprolol tartrate 25mg BID. Last episode 9/2023. Likely SVT with abberancy but since WCT - cannot r/o ischemia. She underwent ischemic w/u with Dr. Alberto. She was told testing was normal. Will request copies.   - She is not interested at this time in EPS/ possible ablation- will continue to monitor since episodes are brief and self-terminate without symptoms.  - Advised to avoid caffeine and maintain adequate hydration.  - Continue metoprolol tartrate 25mg BID and titrate up if episodes persist.   # PAF - No new episodes.  - Continue Eliquis 5mg BID. No s/s bleeding reported.  - I recommend CBC, BMP at least yearly. - Consider ablation if burden increases and/or symptoms.   Sarcoidosis  - No cardiac evidence of sarcoidosis on MRI  - Continue metoprolol 25mg BID    Hypertension  - Patient's pressure WNL.   - Continue Losartan-HCTZ 100-12.5mg daily, metoprolol, furosemide 40mg twice weekly. - BP log at home.     RTO in 9-12 months

## 2024-04-09 ENCOUNTER — APPOINTMENT (OUTPATIENT)
Dept: ORTHOPEDIC SURGERY | Facility: CLINIC | Age: 69
End: 2024-04-09
Payer: MEDICARE

## 2024-04-09 VITALS — BODY MASS INDEX: 21.94 KG/M2 | HEIGHT: 72 IN | WEIGHT: 162 LBS

## 2024-04-09 PROCEDURE — 99202 OFFICE O/P NEW SF 15 MIN: CPT

## 2024-04-09 NOTE — DATA REVIEWED
[FreeTextEntry1] : EMG test reviewed from outside facility documenting bilateral carpal tunnel syndrome.  EMG test was from February 14, 2024

## 2024-04-09 NOTE — ASSESSMENT
[FreeTextEntry1] : Patient has bilateral carpal tunnel syndrome.  The plan will be carpal tunnel release surgery.  Risk and benefits of the surgery include limited to bleeding infection risk injury stiffness and persistent numbness in fingers discussed with the patient.  She would like to discuss this with her daughter.  She will see us back for surgical intervention.  But her daughter will call first we will discuss this with her as well.

## 2024-04-09 NOTE — PHYSICAL EXAM
[de-identified] : Patient has positive Tinel's and median nerve compression test bilaterally.  She has good range of motion to the fingers.  She has normal cap refill.  She has diminished sensation median nerve distribution bilaterally.  There is good thenar strength with no significant atrophy.

## 2024-04-09 NOTE — HISTORY OF PRESENT ILLNESS
[de-identified] : 68-year-old female comes in the office for evaluation because of numbness and tingling in both hands.  She gets woken up from sleep at night with numbness and tingling the fingers.  This happens to both hands.  There is numbness and tingling that is there all the time as well.  She wears gloves that does not help.  She had an EMG test already done.

## 2024-04-12 ENCOUNTER — NON-APPOINTMENT (OUTPATIENT)
Age: 69
End: 2024-04-12

## 2024-04-23 NOTE — ASU PATIENT PROFILE, ADULT - NSICDXPASTMEDICALHX_GEN_ALL_CORE_FT
PAST MEDICAL HISTORY:  Afib     Arthritis     High blood cholesterol     Hypertension     Sarcoidosis

## 2024-04-24 ENCOUNTER — OUTPATIENT (OUTPATIENT)
Dept: OUTPATIENT SERVICES | Facility: HOSPITAL | Age: 69
LOS: 1 days | Discharge: HOME | End: 2024-04-24
Payer: MEDICARE

## 2024-04-24 ENCOUNTER — TRANSCRIPTION ENCOUNTER (OUTPATIENT)
Age: 69
End: 2024-04-24

## 2024-04-24 ENCOUNTER — APPOINTMENT (OUTPATIENT)
Dept: ORTHOPEDIC SURGERY | Facility: HOSPITAL | Age: 69
End: 2024-04-24

## 2024-04-24 VITALS
HEIGHT: 63 IN | TEMPERATURE: 98 F | OXYGEN SATURATION: 98 % | DIASTOLIC BLOOD PRESSURE: 77 MMHG | SYSTOLIC BLOOD PRESSURE: 151 MMHG | HEART RATE: 55 BPM | WEIGHT: 160.94 LBS | RESPIRATION RATE: 20 BRPM

## 2024-04-24 VITALS
DIASTOLIC BLOOD PRESSURE: 69 MMHG | SYSTOLIC BLOOD PRESSURE: 141 MMHG | HEART RATE: 59 BPM | OXYGEN SATURATION: 99 % | RESPIRATION RATE: 19 BRPM

## 2024-04-24 DIAGNOSIS — G56.01 CARPAL TUNNEL SYNDROME, RIGHT UPPER LIMB: ICD-10-CM

## 2024-04-24 DIAGNOSIS — Z98.890 OTHER SPECIFIED POSTPROCEDURAL STATES: Chronic | ICD-10-CM

## 2024-04-24 PROCEDURE — 64721 CARPAL TUNNEL SURGERY: CPT | Mod: RT

## 2024-04-24 RX ORDER — ASPIRIN/CALCIUM CARB/MAGNESIUM 324 MG
0 TABLET ORAL
Qty: 0 | Refills: 0 | DISCHARGE

## 2024-04-24 RX ORDER — LOSARTAN/HYDROCHLOROTHIAZIDE 100MG-25MG
1 TABLET ORAL
Qty: 0 | Refills: 0 | DISCHARGE

## 2024-04-24 NOTE — ASU DISCHARGE PLAN (ADULT/PEDIATRIC) - PAIN MANAGEMENT
Prescriptions electronically submitted to pharmacy from [facility] use tylenol/Take over the counter pain medication

## 2024-04-26 ENCOUNTER — APPOINTMENT (OUTPATIENT)
Dept: CARDIOLOGY | Facility: CLINIC | Age: 69
End: 2024-04-26
Payer: MEDICARE

## 2024-04-26 ENCOUNTER — NON-APPOINTMENT (OUTPATIENT)
Age: 69
End: 2024-04-26

## 2024-04-26 PROBLEM — M19.90 UNSPECIFIED OSTEOARTHRITIS, UNSPECIFIED SITE: Chronic | Status: ACTIVE | Noted: 2024-04-24

## 2024-04-26 PROBLEM — I48.91 UNSPECIFIED ATRIAL FIBRILLATION: Chronic | Status: ACTIVE | Noted: 2024-04-24

## 2024-04-26 PROCEDURE — 93298 REM INTERROG DEV EVAL SCRMS: CPT

## 2024-04-29 DIAGNOSIS — J45.909 UNSPECIFIED ASTHMA, UNCOMPLICATED: ICD-10-CM

## 2024-04-29 DIAGNOSIS — G56.01 CARPAL TUNNEL SYNDROME, RIGHT UPPER LIMB: ICD-10-CM

## 2024-05-02 ENCOUNTER — APPOINTMENT (OUTPATIENT)
Dept: ORTHOPEDIC SURGERY | Facility: CLINIC | Age: 69
End: 2024-05-02
Payer: MEDICARE

## 2024-05-02 VITALS — HEIGHT: 55 IN | WEIGHT: 160 LBS | BODY MASS INDEX: 37.03 KG/M2

## 2024-05-02 PROCEDURE — 99024 POSTOP FOLLOW-UP VISIT: CPT

## 2024-05-02 NOTE — HISTORY OF PRESENT ILLNESS
[de-identified] : Patient is a 68 year F here for her first PO appt. She is status post a right OCTR done by Dr. Elliott. She is doing well.

## 2024-05-02 NOTE — PHYSICAL EXAM
[de-identified] : Physical exam of her right wrist: Resolving swelling and ecchymosis. The wound is clean and dry. No signs of drainage, pus, or infection.  Good range of motion of the fingers. Sensory and motor are intact.

## 2024-05-07 NOTE — ED ADULT NURSE NOTE - HIV OFFER
Patient was given time and location to arrive for procedure as documented in Procedure Pass. All medications to be held DOS unless advised otherwise. Patient informed to shower DOS, avoid putting any products on skin, remove jewelry/piercings, leave valuables at home, and that they will need a  to take them home after procedure and someone to stay with them at home until the next morning. NPO status provided as documented in Procedure Pass. Patient verbalized understanding of the above and all questions were answered.   
Opt out

## 2024-05-31 ENCOUNTER — APPOINTMENT (OUTPATIENT)
Dept: CARDIOLOGY | Facility: CLINIC | Age: 69
End: 2024-05-31
Payer: MEDICARE

## 2024-05-31 ENCOUNTER — NON-APPOINTMENT (OUTPATIENT)
Age: 69
End: 2024-05-31

## 2024-05-31 PROCEDURE — 93298 REM INTERROG DEV EVAL SCRMS: CPT

## 2024-06-18 NOTE — ASU PATIENT PROFILE, ADULT - NSICDXPASTMEDICALHX_GEN_ALL_CORE_FT
PAST MEDICAL HISTORY:  Afib     Arthritis     Asthma     High blood cholesterol     History of loop recorder     Hypertension     Sarcoidosis

## 2024-06-19 ENCOUNTER — OUTPATIENT (OUTPATIENT)
Dept: OUTPATIENT SERVICES | Facility: HOSPITAL | Age: 69
LOS: 1 days | Discharge: HOME | End: 2024-06-19
Payer: MEDICARE

## 2024-06-19 ENCOUNTER — APPOINTMENT (OUTPATIENT)
Dept: ORTHOPEDIC SURGERY | Facility: AMBULATORY SURGERY CENTER | Age: 69
End: 2024-06-19

## 2024-06-19 ENCOUNTER — TRANSCRIPTION ENCOUNTER (OUTPATIENT)
Age: 69
End: 2024-06-19

## 2024-06-19 VITALS
RESPIRATION RATE: 20 BRPM | DIASTOLIC BLOOD PRESSURE: 66 MMHG | SYSTOLIC BLOOD PRESSURE: 137 MMHG | TEMPERATURE: 98 F | OXYGEN SATURATION: 95 % | HEART RATE: 60 BPM | WEIGHT: 162.04 LBS | HEIGHT: 63 IN

## 2024-06-19 VITALS
RESPIRATION RATE: 18 BRPM | TEMPERATURE: 99 F | SYSTOLIC BLOOD PRESSURE: 145 MMHG | DIASTOLIC BLOOD PRESSURE: 71 MMHG | HEART RATE: 61 BPM | OXYGEN SATURATION: 97 %

## 2024-06-19 DIAGNOSIS — Z98.890 OTHER SPECIFIED POSTPROCEDURAL STATES: Chronic | ICD-10-CM

## 2024-06-19 DIAGNOSIS — G56.02 CARPAL TUNNEL SYNDROME, LEFT UPPER LIMB: ICD-10-CM

## 2024-06-19 PROCEDURE — 64721 CARPAL TUNNEL SURGERY: CPT | Mod: 79,LT

## 2024-06-19 RX ORDER — FAMOTIDINE 10 MG/ML
1 INJECTION INTRAVENOUS
Refills: 0 | DISCHARGE

## 2024-06-19 RX ORDER — CELECOXIB 200 MG/1
1 CAPSULE ORAL
Qty: 0 | Refills: 0 | DISCHARGE

## 2024-06-19 RX ORDER — APIXABAN 2.5 MG/1
1 TABLET, FILM COATED ORAL
Refills: 0 | DISCHARGE

## 2024-06-19 RX ORDER — OMEGA-3 ACID ETHYL ESTERS 1 G
1 CAPSULE ORAL
Qty: 0 | Refills: 0 | DISCHARGE

## 2024-06-19 RX ORDER — GABAPENTIN 400 MG/1
1 CAPSULE ORAL
Qty: 0 | Refills: 0 | DISCHARGE

## 2024-06-19 RX ORDER — CHOLECALCIFEROL (VITAMIN D3) 125 MCG
1 CAPSULE ORAL
Qty: 0 | Refills: 0 | DISCHARGE

## 2024-06-19 RX ORDER — LORATADINE 10 MG/1
1 TABLET ORAL
Refills: 0 | DISCHARGE

## 2024-06-19 RX ORDER — IBUPROFEN 200 MG
1 TABLET ORAL
Qty: 20 | Refills: 0
Start: 2024-06-19

## 2024-06-19 RX ORDER — AMLODIPINE BESYLATE 2.5 MG/1
1 TABLET ORAL
Qty: 0 | Refills: 0 | DISCHARGE

## 2024-06-19 RX ORDER — FUROSEMIDE 40 MG
1 TABLET ORAL
Refills: 0 | DISCHARGE

## 2024-06-19 NOTE — ASU PREOP CHECKLIST - NSWEIGHTCALCTOOLDRUG_GEN_A_CORE
1444: report received from Vanna LEE. Pt resting in bed. No c/o pain or nausea at this time. Pt remains hypertensive, anesthesia notified    1500: No new orders received. Appropriate to return to ICU, per anesthesia. Receiving RN not yet ready for report    1515: report given to Stacey LEE    used

## 2024-06-24 DIAGNOSIS — G56.02 CARPAL TUNNEL SYNDROME, LEFT UPPER LIMB: ICD-10-CM

## 2024-06-24 RX ORDER — TRAMADOL HYDROCHLORIDE 50 MG/1
50 TABLET, COATED ORAL
Qty: 30 | Refills: 0 | Status: ACTIVE | COMMUNITY
Start: 2024-06-24 | End: 1900-01-01

## 2024-06-25 DIAGNOSIS — Z79.82 LONG TERM (CURRENT) USE OF ASPIRIN: ICD-10-CM

## 2024-06-25 DIAGNOSIS — Z79.01 LONG TERM (CURRENT) USE OF ANTICOAGULANTS: ICD-10-CM

## 2024-06-25 DIAGNOSIS — G56.02 CARPAL TUNNEL SYNDROME, LEFT UPPER LIMB: ICD-10-CM

## 2024-06-25 DIAGNOSIS — J45.909 UNSPECIFIED ASTHMA, UNCOMPLICATED: ICD-10-CM

## 2024-06-27 ENCOUNTER — APPOINTMENT (OUTPATIENT)
Dept: ORTHOPEDIC SURGERY | Facility: CLINIC | Age: 69
End: 2024-06-27
Payer: MEDICARE

## 2024-06-27 DIAGNOSIS — G56.01 CARPAL TUNNEL SYNDROME, RIGHT UPPER LIMB: ICD-10-CM

## 2024-06-27 PROCEDURE — 99024 POSTOP FOLLOW-UP VISIT: CPT

## 2024-07-03 ENCOUNTER — NON-APPOINTMENT (OUTPATIENT)
Age: 69
End: 2024-07-03

## 2024-07-03 ENCOUNTER — APPOINTMENT (OUTPATIENT)
Dept: CARDIOLOGY | Facility: CLINIC | Age: 69
End: 2024-07-03
Payer: MEDICARE

## 2024-07-03 PROCEDURE — 93298 REM INTERROG DEV EVAL SCRMS: CPT

## 2024-07-22 ENCOUNTER — APPOINTMENT (OUTPATIENT)
Dept: PULMONOLOGY | Facility: CLINIC | Age: 69
End: 2024-07-22

## 2024-07-29 ENCOUNTER — RX RENEWAL (OUTPATIENT)
Age: 69
End: 2024-07-29

## 2024-08-07 ENCOUNTER — APPOINTMENT (OUTPATIENT)
Dept: CARDIOLOGY | Facility: CLINIC | Age: 69
End: 2024-08-07

## 2024-08-07 ENCOUNTER — NON-APPOINTMENT (OUTPATIENT)
Age: 69
End: 2024-08-07

## 2024-08-07 PROCEDURE — 93298 REM INTERROG DEV EVAL SCRMS: CPT

## 2024-09-10 ENCOUNTER — NON-APPOINTMENT (OUTPATIENT)
Age: 69
End: 2024-09-10

## 2024-09-11 ENCOUNTER — APPOINTMENT (OUTPATIENT)
Dept: CARDIOLOGY | Facility: CLINIC | Age: 69
End: 2024-09-11

## 2024-09-11 PROCEDURE — 93298 REM INTERROG DEV EVAL SCRMS: CPT

## 2024-10-03 ENCOUNTER — APPOINTMENT (OUTPATIENT)
Dept: PULMONOLOGY | Facility: CLINIC | Age: 69
End: 2024-10-03
Payer: MEDICARE

## 2024-10-03 VITALS
WEIGHT: 162 LBS | OXYGEN SATURATION: 98 % | DIASTOLIC BLOOD PRESSURE: 68 MMHG | HEART RATE: 80 BPM | SYSTOLIC BLOOD PRESSURE: 138 MMHG | HEIGHT: 63 IN | BODY MASS INDEX: 28.7 KG/M2

## 2024-10-03 DIAGNOSIS — G47.33 OBSTRUCTIVE SLEEP APNEA (ADULT) (PEDIATRIC): ICD-10-CM

## 2024-10-03 DIAGNOSIS — J30.1 ALLERGIC RHINITIS DUE TO POLLEN: ICD-10-CM

## 2024-10-03 DIAGNOSIS — R09.82 POSTNASAL DRIP: ICD-10-CM

## 2024-10-03 PROCEDURE — 99213 OFFICE O/P EST LOW 20 MIN: CPT

## 2024-10-03 PROCEDURE — G2211 COMPLEX E/M VISIT ADD ON: CPT

## 2024-10-03 RX ORDER — AZELASTINE HYDROCHLORIDE 137 UG/1
0.1 SPRAY, METERED NASAL DAILY
Qty: 1 | Refills: 5 | Status: ACTIVE | COMMUNITY
Start: 2024-10-03 | End: 1900-01-01

## 2024-10-03 NOTE — ASSESSMENT
[FreeTextEntry1] : Assessment and Plan: - Allergic Rhinitis : The patient's symptoms indicate allergic rhinitis triggered by pollen and other allergens, resulting in post-nasal drip and a consequent cough. - Prescribe a nasal spray (Azelastine or Fluticasone) to be used daily.  - Patient advised to wash nose twice daily with Simply Saline.  - Patient to continue using inhalers as normal.  - Follow-up visit scheduled for the spring to assess progress.  - Sleep Apnea : The patient's problems with their C-PAP machine suggest they may need adjustments for better management of their sleep apnea. - Patient advised to practice using C-PAP machine during the day, holding the mask in place manually before trying to use it at night.  - Patient advised to consider a different mask style if the current one continues to prove uncomfortable.

## 2024-10-03 NOTE — HISTORY OF PRESENT ILLNESS
[TextBox_4] : - Summary : The patient presented with common symptoms of sinusitis and cough, likely due to allergies. The patient also reported problems with a C-PAP machine. - Chief Complaint (CC) : The patient's primary concern was a persistent cough and difficulties using a C-PAP machine. - History of Present Illness : The patient has been experiencing symptoms of sinusitis, including a chronic cough that may be resulting from post-nasal drip. The patient has noticed a constant need to clear the throat and occasional feelings of chest discomfort. The patient reported that the cough seems to get worse at night. The patient has also been having difficulties using their prescribed C-PAP machine, including problems synchronizing their breathing pattern with the machine, which is likely contributing to their discomfort and symptoms. - Past Medical History : The patient has a history of respiratory issues due to allergies, which requires the use of a C-PAP machine and inhalers. The patient has experienced these symptoms for an extended period, and it seems they have become more severe in recent times.

## 2024-10-15 ENCOUNTER — NON-APPOINTMENT (OUTPATIENT)
Age: 69
End: 2024-10-15

## 2024-10-15 ENCOUNTER — APPOINTMENT (OUTPATIENT)
Dept: CARDIOLOGY | Facility: CLINIC | Age: 69
End: 2024-10-15
Payer: MEDICARE

## 2024-10-15 PROCEDURE — 93298 REM INTERROG DEV EVAL SCRMS: CPT

## 2024-11-04 ENCOUNTER — RX RENEWAL (OUTPATIENT)
Age: 69
End: 2024-11-04

## 2024-11-19 ENCOUNTER — APPOINTMENT (OUTPATIENT)
Dept: CARDIOLOGY | Facility: CLINIC | Age: 69
End: 2024-11-19
Payer: MEDICARE

## 2024-11-19 ENCOUNTER — NON-APPOINTMENT (OUTPATIENT)
Age: 69
End: 2024-11-19

## 2024-11-19 PROCEDURE — 93298 REM INTERROG DEV EVAL SCRMS: CPT

## 2024-12-24 ENCOUNTER — NON-APPOINTMENT (OUTPATIENT)
Age: 69
End: 2024-12-24

## 2024-12-24 ENCOUNTER — APPOINTMENT (OUTPATIENT)
Dept: CARDIOLOGY | Facility: CLINIC | Age: 69
End: 2024-12-24
Payer: MEDICARE

## 2024-12-24 PROCEDURE — 93298 REM INTERROG DEV EVAL SCRMS: CPT

## 2025-01-17 ENCOUNTER — APPOINTMENT (OUTPATIENT)
Dept: ELECTROPHYSIOLOGY | Facility: CLINIC | Age: 70
End: 2025-01-17

## 2025-01-17 ENCOUNTER — NON-APPOINTMENT (OUTPATIENT)
Age: 70
End: 2025-01-17

## 2025-01-17 VITALS
HEART RATE: 85 BPM | BODY MASS INDEX: 30.12 KG/M2 | HEIGHT: 63 IN | WEIGHT: 170 LBS | DIASTOLIC BLOOD PRESSURE: 75 MMHG | TEMPERATURE: 97.1 F | SYSTOLIC BLOOD PRESSURE: 148 MMHG

## 2025-01-17 DIAGNOSIS — I47.10 SUPRAVENTRICULAR TACHYCARDIA, UNSPECIFIED: ICD-10-CM

## 2025-01-17 DIAGNOSIS — I48.0 PAROXYSMAL ATRIAL FIBRILLATION: ICD-10-CM

## 2025-01-17 DIAGNOSIS — Z45.09 ENCOUNTER FOR ADJUSTMENT AND MANAGEMENT OF OTHER CARDIAC DEVICE: ICD-10-CM

## 2025-01-17 PROCEDURE — 99214 OFFICE O/P EST MOD 30 MIN: CPT

## 2025-01-17 PROCEDURE — 93291 INTERROG DEV EVAL SCRMS IP: CPT

## 2025-02-06 RX ORDER — FLUTICASONE PROPIONATE AND SALMETEROL 250; 50 UG/1; UG/1
250-50 POWDER RESPIRATORY (INHALATION)
Qty: 180 | Refills: 3 | Status: ACTIVE | COMMUNITY
Start: 2025-02-03 | End: 1900-01-01

## 2025-02-19 NOTE — ASU DISCHARGE PLAN (ADULT/PEDIATRIC) - CONDITION AT DISCHARGE
Please have blood work done in the next 1-2 weeks - no food after midnight, no appointment necessary.  We are open Monday-Saturday starting at 7am.    Return to clinic in 3 months.   Stable

## 2025-02-20 ENCOUNTER — APPOINTMENT (OUTPATIENT)
Dept: PULMONOLOGY | Facility: CLINIC | Age: 70
End: 2025-02-20
Payer: MEDICARE

## 2025-02-20 VITALS
HEART RATE: 71 BPM | HEIGHT: 63 IN | DIASTOLIC BLOOD PRESSURE: 74 MMHG | WEIGHT: 170 LBS | BODY MASS INDEX: 30.12 KG/M2 | SYSTOLIC BLOOD PRESSURE: 122 MMHG | OXYGEN SATURATION: 97 %

## 2025-02-20 DIAGNOSIS — D86.9 SARCOIDOSIS, UNSPECIFIED: ICD-10-CM

## 2025-02-20 DIAGNOSIS — J45.909 UNSPECIFIED ASTHMA, UNCOMPLICATED: ICD-10-CM

## 2025-02-20 PROCEDURE — 99214 OFFICE O/P EST MOD 30 MIN: CPT

## 2025-02-20 PROCEDURE — G2211 COMPLEX E/M VISIT ADD ON: CPT

## 2025-02-20 RX ORDER — PREDNISONE 10 MG/1
10 TABLET ORAL
Qty: 20 | Refills: 1 | Status: ACTIVE | COMMUNITY
Start: 2025-02-20 | End: 1900-01-01

## 2025-02-21 ENCOUNTER — APPOINTMENT (OUTPATIENT)
Dept: CARDIOLOGY | Facility: CLINIC | Age: 70
End: 2025-02-21

## 2025-02-21 PROCEDURE — 93298 REM INTERROG DEV EVAL SCRMS: CPT

## 2025-03-03 ENCOUNTER — RX RENEWAL (OUTPATIENT)
Age: 70
End: 2025-03-03

## 2025-03-03 RX ORDER — AZELASTINE HYDROCHLORIDE 137 UG/1
137 SPRAY, METERED NASAL
Qty: 30 | Refills: 5 | Status: ACTIVE | COMMUNITY
Start: 2025-03-03 | End: 1900-01-01

## 2025-03-13 ENCOUNTER — RX RENEWAL (OUTPATIENT)
Age: 70
End: 2025-03-13

## 2025-03-28 ENCOUNTER — NON-APPOINTMENT (OUTPATIENT)
Age: 70
End: 2025-03-28

## 2025-03-28 ENCOUNTER — APPOINTMENT (OUTPATIENT)
Dept: CARDIOLOGY | Facility: CLINIC | Age: 70
End: 2025-03-28

## 2025-03-28 PROCEDURE — 93298 REM INTERROG DEV EVAL SCRMS: CPT

## 2025-04-28 ENCOUNTER — RX RENEWAL (OUTPATIENT)
Age: 70
End: 2025-04-28

## 2025-05-01 ENCOUNTER — APPOINTMENT (OUTPATIENT)
Dept: OTOLARYNGOLOGY | Facility: CLINIC | Age: 70
End: 2025-05-01

## 2025-05-01 VITALS — WEIGHT: 170 LBS | BODY MASS INDEX: 30.12 KG/M2 | HEIGHT: 63 IN

## 2025-05-01 DIAGNOSIS — R06.83 SNORING: ICD-10-CM

## 2025-05-01 DIAGNOSIS — G47.33 OBSTRUCTIVE SLEEP APNEA (ADULT) (PEDIATRIC): ICD-10-CM

## 2025-05-01 DIAGNOSIS — J31.0 CHRONIC RHINITIS: ICD-10-CM

## 2025-05-01 PROCEDURE — 99203 OFFICE O/P NEW LOW 30 MIN: CPT | Mod: 25

## 2025-05-01 PROCEDURE — 31575 DIAGNOSTIC LARYNGOSCOPY: CPT

## 2025-05-02 ENCOUNTER — APPOINTMENT (OUTPATIENT)
Dept: CARDIOLOGY | Facility: CLINIC | Age: 70
End: 2025-05-02

## 2025-05-02 PROCEDURE — 93298 REM INTERROG DEV EVAL SCRMS: CPT

## 2025-05-05 PROBLEM — R06.83 SNORING: Status: ACTIVE | Noted: 2025-05-05

## 2025-05-28 ENCOUNTER — RX RENEWAL (OUTPATIENT)
Age: 70
End: 2025-05-28

## 2025-06-05 ENCOUNTER — NON-APPOINTMENT (OUTPATIENT)
Age: 70
End: 2025-06-05

## 2025-06-05 ENCOUNTER — APPOINTMENT (OUTPATIENT)
Dept: CARDIOLOGY | Facility: CLINIC | Age: 70
End: 2025-06-05
Payer: MEDICARE

## 2025-06-05 PROCEDURE — 93298 REM INTERROG DEV EVAL SCRMS: CPT

## 2025-07-10 ENCOUNTER — NON-APPOINTMENT (OUTPATIENT)
Age: 70
End: 2025-07-10

## 2025-07-10 ENCOUNTER — APPOINTMENT (OUTPATIENT)
Dept: CARDIOLOGY | Facility: CLINIC | Age: 70
End: 2025-07-10
Payer: MEDICARE

## 2025-07-10 PROCEDURE — 93298 REM INTERROG DEV EVAL SCRMS: CPT

## 2025-08-11 ENCOUNTER — NON-APPOINTMENT (OUTPATIENT)
Age: 70
End: 2025-08-11

## 2025-08-14 ENCOUNTER — NON-APPOINTMENT (OUTPATIENT)
Age: 70
End: 2025-08-14

## 2025-08-14 ENCOUNTER — APPOINTMENT (OUTPATIENT)
Dept: CARDIOLOGY | Facility: CLINIC | Age: 70
End: 2025-08-14
Payer: MEDICARE

## 2025-08-14 PROCEDURE — 93298 REM INTERROG DEV EVAL SCRMS: CPT

## 2025-09-03 ENCOUNTER — APPOINTMENT (OUTPATIENT)
Dept: PULMONOLOGY | Facility: CLINIC | Age: 70
End: 2025-09-03
Payer: MEDICARE

## 2025-09-03 VITALS
DIASTOLIC BLOOD PRESSURE: 70 MMHG | HEART RATE: 83 BPM | OXYGEN SATURATION: 95 % | RESPIRATION RATE: 15 BRPM | HEIGHT: 63 IN | WEIGHT: 170 LBS | SYSTOLIC BLOOD PRESSURE: 130 MMHG | BODY MASS INDEX: 30.12 KG/M2

## 2025-09-03 DIAGNOSIS — G47.33 OBSTRUCTIVE SLEEP APNEA (ADULT) (PEDIATRIC): ICD-10-CM

## 2025-09-03 DIAGNOSIS — J30.1 ALLERGIC RHINITIS DUE TO POLLEN: ICD-10-CM

## 2025-09-03 DIAGNOSIS — D86.9 SARCOIDOSIS, UNSPECIFIED: ICD-10-CM

## 2025-09-03 DIAGNOSIS — J45.909 UNSPECIFIED ASTHMA, UNCOMPLICATED: ICD-10-CM

## 2025-09-03 PROCEDURE — 99214 OFFICE O/P EST MOD 30 MIN: CPT

## 2025-09-03 PROCEDURE — G2211 COMPLEX E/M VISIT ADD ON: CPT

## 2025-09-08 ENCOUNTER — RX RENEWAL (OUTPATIENT)
Age: 70
End: 2025-09-08

## 2025-09-18 ENCOUNTER — NON-APPOINTMENT (OUTPATIENT)
Age: 70
End: 2025-09-18

## 2025-09-18 ENCOUNTER — APPOINTMENT (OUTPATIENT)
Dept: CARDIOLOGY | Facility: CLINIC | Age: 70
End: 2025-09-18
Payer: MEDICARE

## 2025-09-18 PROCEDURE — 93298 REM INTERROG DEV EVAL SCRMS: CPT
